# Patient Record
Sex: MALE | Race: BLACK OR AFRICAN AMERICAN | Employment: STUDENT | URBAN - METROPOLITAN AREA
[De-identification: names, ages, dates, MRNs, and addresses within clinical notes are randomized per-mention and may not be internally consistent; named-entity substitution may affect disease eponyms.]

---
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
## 2023-10-30 ENCOUNTER — TELEPHONE (OUTPATIENT)
Dept: FAMILY MEDICINE CLINIC | Age: 23
End: 2023-10-30

## 2023-12-06 PROBLEM — F22 DELUSIONAL DISORDERS (HCC): Status: ACTIVE | Noted: 2018-07-17

## 2023-12-07 ENCOUNTER — TELEPHONE (OUTPATIENT)
Dept: FAMILY MEDICINE CLINIC | Age: 23
End: 2023-12-07

## 2024-02-16 ENCOUNTER — TELEPHONE (OUTPATIENT)
Dept: FAMILY MEDICINE CLINIC | Age: 24
End: 2024-02-16

## 2024-02-19 NOTE — TELEPHONE ENCOUNTER
Has not establish with [facility]. Will put that he can't make an appt with [facility].

## 2024-05-25 ENCOUNTER — HOSPITAL ENCOUNTER (INPATIENT)
Age: 24
LOS: 9 days | Discharge: HOME | DRG: 750 | End: 2024-06-03
Attending: EMERGENCY MEDICINE | Admitting: PSYCHIATRY & NEUROLOGY
Payer: COMMERCIAL

## 2024-05-25 DIAGNOSIS — F20.9 SCHIZOPHRENIA, UNSPECIFIED TYPE (HCC): Primary | ICD-10-CM

## 2024-05-25 LAB
ALBUMIN SERPL-MCNC: 4.9 G/DL (ref 3.5–4.6)
ALP SERPL-CCNC: 57 U/L (ref 35–104)
ALT SERPL-CCNC: 21 U/L (ref 0–41)
AMPHET UR QL SCN: NORMAL
ANION GAP SERPL CALCULATED.3IONS-SCNC: 14 MEQ/L (ref 9–15)
APAP SERPL-MCNC: <5 UG/ML (ref 10–30)
AST SERPL-CCNC: 26 U/L (ref 0–40)
BARBITURATES UR QL SCN: NORMAL
BASOPHILS # BLD: 0 K/UL (ref 0–0.2)
BASOPHILS NFR BLD: 0.2 %
BENZODIAZ UR QL SCN: NORMAL
BILIRUB SERPL-MCNC: 0.7 MG/DL (ref 0.2–0.7)
BUN SERPL-MCNC: 5 MG/DL (ref 6–20)
CALCIUM SERPL-MCNC: 9.1 MG/DL (ref 8.5–9.9)
CANNABINOIDS UR QL SCN: NORMAL
CHLORIDE SERPL-SCNC: 96 MEQ/L (ref 95–107)
CHOLEST SERPL-MCNC: 157 MG/DL (ref 0–199)
CK SERPL-CCNC: 282 U/L (ref 0–190)
CO2 SERPL-SCNC: 24 MEQ/L (ref 20–31)
COCAINE UR QL SCN: NORMAL
CREAT SERPL-MCNC: 0.88 MG/DL (ref 0.7–1.2)
DRUG SCREEN COMMENT UR-IMP: NORMAL
EOSINOPHIL # BLD: 0 K/UL (ref 0–0.7)
EOSINOPHIL NFR BLD: 0.1 %
ERYTHROCYTE [DISTWIDTH] IN BLOOD BY AUTOMATED COUNT: 12.6 % (ref 11.5–14.5)
ETHANOL PERCENT: NORMAL G/DL
ETHANOLAMINE SERPL-MCNC: <10 MG/DL (ref 0–0.08)
FENTANYL SCREEN, URINE: NORMAL
GLOBULIN SER CALC-MCNC: 3.1 G/DL (ref 2.3–3.5)
GLUCOSE SERPL-MCNC: 157 MG/DL (ref 70–99)
HCT VFR BLD AUTO: 41.1 % (ref 42–52)
HDLC SERPL-MCNC: 87 MG/DL (ref 40–59)
HGB BLD-MCNC: 14.4 G/DL (ref 14–18)
LDLC SERPL CALC-MCNC: 54 MG/DL (ref 0–129)
LYMPHOCYTES # BLD: 0.8 K/UL (ref 1–4.8)
LYMPHOCYTES NFR BLD: 9.2 %
MCH RBC QN AUTO: 31.9 PG (ref 27–31.3)
MCHC RBC AUTO-ENTMCNC: 35 % (ref 33–37)
MCV RBC AUTO: 91.1 FL (ref 79–92.2)
METHADONE UR QL SCN: NORMAL
MONOCYTES # BLD: 1 K/UL (ref 0.2–0.8)
MONOCYTES NFR BLD: 10.5 %
NEUTROPHILS # BLD: 7.3 K/UL (ref 1.4–6.5)
NEUTS SEG NFR BLD: 79.7 %
OPIATES UR QL SCN: NORMAL
OXYCODONE UR QL SCN: NORMAL
PCP UR QL SCN: NORMAL
PLATELET # BLD AUTO: 276 K/UL (ref 130–400)
POTASSIUM SERPL-SCNC: 3.6 MEQ/L (ref 3.4–4.9)
PROPOXYPH UR QL SCN: NORMAL
PROT SERPL-MCNC: 8 G/DL (ref 6.3–8)
RBC # BLD AUTO: 4.51 M/UL (ref 4.7–6.1)
SALICYLATES SERPL-MCNC: <0.3 MG/DL (ref 15–30)
SODIUM SERPL-SCNC: 134 MEQ/L (ref 135–144)
TRIGL SERPL-MCNC: 79 MG/DL (ref 0–150)
TSH SERPL-MCNC: 1.15 UIU/ML (ref 0.44–3.86)
WBC # BLD AUTO: 9.2 K/UL (ref 4.8–10.8)

## 2024-05-25 PROCEDURE — 80061 LIPID PANEL: CPT

## 2024-05-25 PROCEDURE — 85025 COMPLETE CBC W/AUTO DIFF WBC: CPT

## 2024-05-25 PROCEDURE — 99285 EMERGENCY DEPT VISIT HI MDM: CPT

## 2024-05-25 PROCEDURE — 93005 ELECTROCARDIOGRAM TRACING: CPT | Performed by: EMERGENCY MEDICINE

## 2024-05-25 PROCEDURE — 82550 ASSAY OF CK (CPK): CPT

## 2024-05-25 PROCEDURE — 80143 DRUG ASSAY ACETAMINOPHEN: CPT

## 2024-05-25 PROCEDURE — 82077 ASSAY SPEC XCP UR&BREATH IA: CPT

## 2024-05-25 PROCEDURE — 1240000000 HC EMOTIONAL WELLNESS R&B

## 2024-05-25 PROCEDURE — 84443 ASSAY THYROID STIM HORMONE: CPT

## 2024-05-25 PROCEDURE — 80179 DRUG ASSAY SALICYLATE: CPT

## 2024-05-25 PROCEDURE — 36415 COLL VENOUS BLD VENIPUNCTURE: CPT

## 2024-05-25 PROCEDURE — 6360000002 HC RX W HCPCS: Performed by: PSYCHIATRY & NEUROLOGY

## 2024-05-25 PROCEDURE — 80307 DRUG TEST PRSMV CHEM ANLYZR: CPT

## 2024-05-25 PROCEDURE — 6370000000 HC RX 637 (ALT 250 FOR IP): Performed by: PSYCHIATRY & NEUROLOGY

## 2024-05-25 PROCEDURE — 83036 HEMOGLOBIN GLYCOSYLATED A1C: CPT

## 2024-05-25 PROCEDURE — 80053 COMPREHEN METABOLIC PANEL: CPT

## 2024-05-25 RX ORDER — LORAZEPAM 2 MG/ML
3 INJECTION INTRAMUSCULAR
Status: DISCONTINUED | OUTPATIENT
Start: 2024-05-25 | End: 2024-05-29

## 2024-05-25 RX ORDER — LORAZEPAM 1 MG/1
1 TABLET ORAL EVERY 4 HOURS PRN
Status: DISCONTINUED | OUTPATIENT
Start: 2024-05-25 | End: 2024-05-25

## 2024-05-25 RX ORDER — BENZTROPINE MESYLATE 1 MG/ML
2 INJECTION INTRAMUSCULAR; INTRAVENOUS 2 TIMES DAILY PRN
Status: DISCONTINUED | OUTPATIENT
Start: 2024-05-25 | End: 2024-05-27

## 2024-05-25 RX ORDER — FOLIC ACID 1 MG/1
1 TABLET ORAL DAILY
Status: DISCONTINUED | OUTPATIENT
Start: 2024-05-26 | End: 2024-06-03 | Stop reason: HOSPADM

## 2024-05-25 RX ORDER — LANOLIN ALCOHOL/MO/W.PET/CERES
100 CREAM (GRAM) TOPICAL DAILY
Status: DISCONTINUED | OUTPATIENT
Start: 2024-05-26 | End: 2024-06-03 | Stop reason: HOSPADM

## 2024-05-25 RX ORDER — PALIPERIDONE 6 MG/1
6 TABLET, EXTENDED RELEASE ORAL DAILY
Status: DISCONTINUED | OUTPATIENT
Start: 2024-05-25 | End: 2024-05-26

## 2024-05-25 RX ORDER — HALOPERIDOL 5 MG/1
5 TABLET ORAL EVERY 6 HOURS PRN
Status: DISCONTINUED | OUTPATIENT
Start: 2024-05-25 | End: 2024-06-03 | Stop reason: HOSPADM

## 2024-05-25 RX ORDER — HYDROXYZINE PAMOATE 50 MG/1
50 CAPSULE ORAL EVERY 6 HOURS PRN
Status: DISCONTINUED | OUTPATIENT
Start: 2024-05-25 | End: 2024-06-03 | Stop reason: HOSPADM

## 2024-05-25 RX ORDER — LORAZEPAM 1 MG/1
1 TABLET ORAL
Status: DISCONTINUED | OUTPATIENT
Start: 2024-05-25 | End: 2024-05-29

## 2024-05-25 RX ORDER — MAGNESIUM HYDROXIDE/ALUMINUM HYDROXICE/SIMETHICONE 120; 1200; 1200 MG/30ML; MG/30ML; MG/30ML
30 SUSPENSION ORAL PRN
Status: DISCONTINUED | OUTPATIENT
Start: 2024-05-25 | End: 2024-06-03 | Stop reason: HOSPADM

## 2024-05-25 RX ORDER — LORAZEPAM 0.5 MG/1
0.5 TABLET ORAL
Status: DISCONTINUED | OUTPATIENT
Start: 2024-05-25 | End: 2024-05-29

## 2024-05-25 RX ORDER — LORAZEPAM 0.5 MG/1
0.5 TABLET ORAL EVERY 4 HOURS PRN
Status: DISCONTINUED | OUTPATIENT
Start: 2024-05-25 | End: 2024-05-25

## 2024-05-25 RX ORDER — TRAZODONE HYDROCHLORIDE 50 MG/1
50 TABLET ORAL NIGHTLY PRN
Status: DISCONTINUED | OUTPATIENT
Start: 2024-05-25 | End: 2024-06-03 | Stop reason: HOSPADM

## 2024-05-25 RX ORDER — LORAZEPAM 2 MG/ML
4 INJECTION INTRAMUSCULAR
Status: DISCONTINUED | OUTPATIENT
Start: 2024-05-25 | End: 2024-05-25

## 2024-05-25 RX ORDER — MIRTAZAPINE 15 MG/1
15 TABLET, FILM COATED ORAL NIGHTLY
Status: DISCONTINUED | OUTPATIENT
Start: 2024-05-25 | End: 2024-06-03 | Stop reason: HOSPADM

## 2024-05-25 RX ORDER — ACETAMINOPHEN 325 MG/1
650 TABLET ORAL EVERY 4 HOURS PRN
Status: DISCONTINUED | OUTPATIENT
Start: 2024-05-25 | End: 2024-06-03 | Stop reason: HOSPADM

## 2024-05-25 RX ORDER — LORAZEPAM 2 MG/ML
0.5 INJECTION INTRAMUSCULAR
Status: DISCONTINUED | OUTPATIENT
Start: 2024-05-25 | End: 2024-05-29

## 2024-05-25 RX ORDER — HALOPERIDOL 5 MG/ML
5 INJECTION INTRAMUSCULAR EVERY 6 HOURS PRN
Status: DISCONTINUED | OUTPATIENT
Start: 2024-05-25 | End: 2024-06-03 | Stop reason: HOSPADM

## 2024-05-25 RX ORDER — LORAZEPAM 2 MG/1
2 TABLET ORAL
Status: DISCONTINUED | OUTPATIENT
Start: 2024-05-25 | End: 2024-05-25

## 2024-05-25 RX ORDER — LORAZEPAM 2 MG/1
4 TABLET ORAL
Status: DISCONTINUED | OUTPATIENT
Start: 2024-05-25 | End: 2024-05-29

## 2024-05-25 RX ORDER — LORAZEPAM 2 MG/ML
1 INJECTION INTRAMUSCULAR
Status: DISCONTINUED | OUTPATIENT
Start: 2024-05-25 | End: 2024-05-29

## 2024-05-25 RX ORDER — LORAZEPAM 2 MG/ML
2 INJECTION INTRAMUSCULAR
Status: DISCONTINUED | OUTPATIENT
Start: 2024-05-25 | End: 2024-05-29

## 2024-05-25 RX ORDER — HYDROXYZINE HYDROCHLORIDE 50 MG/ML
50 INJECTION, SOLUTION INTRAMUSCULAR EVERY 6 HOURS PRN
Status: DISCONTINUED | OUTPATIENT
Start: 2024-05-25 | End: 2024-06-03 | Stop reason: HOSPADM

## 2024-05-25 RX ORDER — LORAZEPAM 2 MG/ML
4 INJECTION INTRAMUSCULAR
Status: DISCONTINUED | OUTPATIENT
Start: 2024-05-25 | End: 2024-05-29

## 2024-05-25 RX ORDER — MIRTAZAPINE 15 MG/1
15 TABLET, FILM COATED ORAL NIGHTLY
COMMUNITY

## 2024-05-25 RX ORDER — ONDANSETRON 4 MG/1
4 TABLET, ORALLY DISINTEGRATING ORAL EVERY 6 HOURS PRN
Status: DISCONTINUED | OUTPATIENT
Start: 2024-05-25 | End: 2024-06-03 | Stop reason: HOSPADM

## 2024-05-25 RX ORDER — LORAZEPAM 2 MG/1
2 TABLET ORAL
Status: DISCONTINUED | OUTPATIENT
Start: 2024-05-25 | End: 2024-05-29

## 2024-05-25 RX ADMIN — MIRTAZAPINE 15 MG: 15 TABLET, FILM COATED ORAL at 20:38

## 2024-05-25 RX ADMIN — PALIPERIDONE 6 MG: 6 TABLET, EXTENDED RELEASE ORAL at 16:20

## 2024-05-25 RX ADMIN — HYDROXYZINE PAMOATE 50 MG: 50 CAPSULE ORAL at 15:23

## 2024-05-25 RX ADMIN — HYDROXYZINE HYDROCHLORIDE 50 MG: 50 INJECTION, SOLUTION INTRAMUSCULAR at 21:44

## 2024-05-25 RX ADMIN — HALOPERIDOL 5 MG: 5 TABLET ORAL at 21:24

## 2024-05-25 RX ADMIN — HALOPERIDOL 5 MG: 5 TABLET ORAL at 15:23

## 2024-05-25 ASSESSMENT — LIFESTYLE VARIABLES
HOW OFTEN DO YOU HAVE A DRINK CONTAINING ALCOHOL: 4 OR MORE TIMES A WEEK
HOW MANY STANDARD DRINKS CONTAINING ALCOHOL DO YOU HAVE ON A TYPICAL DAY: 3 OR 4

## 2024-05-25 ASSESSMENT — PAIN - FUNCTIONAL ASSESSMENT: PAIN_FUNCTIONAL_ASSESSMENT: NONE - DENIES PAIN

## 2024-05-25 NOTE — GROUP NOTE
Group Therapy Note    Date: 5/25/2024    Group Start Time: 1630  Group End Time: 1730  Group Topic: Activity    [facility] 3W Natasha Denney    Patients started off group by sharing negative phrases that people have said to them, or that they have thought about themselves.These phrases were written on the board, we came back to them later. Patients were encouraged to \"take as much as you need\" from a roll of toilet paper, with no other explanation. Patients discovered that for each square of toilet paper, was one fact that they had to share about themselves. Once patients finished sharing their facts, they were invited to throw the crumpled up pieces of toilet paper at the white board \"knocking out\" each negative phrase one by one. Each phrase that was hit was reworded by the group into a more positive manner.     Group Therapy Note    Attendees: 9/19     Notes:  Pt did not attend group.    Modes of Intervention: Education and Activity      Discipline Responsible: [employer]      Signature:  Natasha Vanegas

## 2024-05-25 NOTE — ED NOTES
Reviewed patient with Dr. Rasmussen. Reviewed current and past history and unable to obtain much information secondary to thought blocking, unknown amount of alcohol that he is drinking. Orders received to admit to 3W with standard prns, resume home medications (invega and remeron) and place on ciwa protocol with ativan secondary to unknown amount of alcohol usage.

## 2024-05-25 NOTE — ED NOTES
Provisional Diagnosis:   Schizophrenia     Psychosocial and Contextual Factors:    Born and raised in [place]. Graduated from [employer]. Does not work. First Schizophrenic break in 2019. Lives at home with Mom but she reports he was staying with a girlfriend and just showed back up last night.     C-SSRS Summary:    C-SSRS Suicide Screening1) Within the past month, have you wished you were dead or wished you could go to sleep and not wake up? : No2) Have you actually had any thoughts of killing yourself? : No6) Have you ever done anything, started to do anything, or prepared to do anything to end your life?: NoRisk of Suicide: No Risk  C-SSRS Risk AssessmentSuicidal and Self-Injurious Behavior : Denies suicidal and self-injurious behaviorSuicidal Ideation (Most Severe in Past Month): Denies suicidal ideationTreatment History: Noncompliant with treatment    Patient: cooperative, disorganized, slow to respond   Family: Mom brought patient in: reports he has been staying with a girlfriend for the past several weeks and he just came to the house last night and was acting this way and brought him in for evaluation. Reports he has been off his medications probably for the past 2 to 3 weeks.   Agency: [facility], active with psychiatry, Had telehealth appt. On 5/9/24 with Jonathan Plaza     Substance Abuse: Reports drinking alcohol but states \"not that much\" His mom reports he has been smoking marijuana however [employer]-Tox is negative upon arrival.     Present Suicidal Behavior:      Verbal: Denies    Attempt:Denies    Past Suicidal Behavior:     Verbal:Denies    Attempt:Denies      Self-Injurious/Self-Mutilation:Denies      Violence Current or Past; Denies. Mom also reports no history of violence that she is aware of       Trauma Identified:  Denies     Protective Factors:    Active with [facility]   Supportive Family       Risk Factors:    Poor Insight         Clinical Summary:    Patient presented to the ED accompanied by his Mom

## 2024-05-25 NOTE — ED NOTES
Pt was changed into BH clothes, belongings placed in locker 4, urine sample obtained and sent to lab.

## 2024-05-25 NOTE — ED PROVIDER NOTES
11:21 AM EDT  [facility] ED  EMERGENCY DEPARTMENT ENCOUNTER      Pt Name: Hugo Nassar  MRN: [id number]  Birthdate 2000  Date of evaluation: 5/25/2024  Provider: Guy Bird MD    CHIEF COMPLAINT       Chief Complaint   Patient presents with   • Mental Health Problem     Having a mental break. Has been off his meds for 2 weeks and drinking and smoking weed. History of schizophrenia. Hallucinating, thinking people are chasing him.          HISTORY OF PRESENT ILLNESS   (Location/Symptom, Timing/Onset, Context/Setting, Quality, Duration, Modifying Factors, Severity)  Note limiting factors.   23-year-old male presenting for mental health evaluation.  History of schizophrenia and has been reportedly off his medications.  Mom reports that he is hallucinating.  Denies any SI or HI to me.  History is difficult to obtain from him.      Nursing Notes were reviewed.    REVIEW OF SYSTEMS    (2-9 systems for level 4, 10 or more for level 5)     Review of Systems   Unable to perform ROS: Psychiatric disorder   All other systems reviewed and are negative.      Except as noted above the remainder of the review of systems was reviewed and negative.       PAST MEDICAL HISTORY     Past Medical History:   Diagnosis Date   • Anxiety    • Attention deficit disorder    • Depression          SURGICAL HISTORY       Past Surgical History:   Procedure Laterality Date   • DENTAL SURGERY  2006   • FACIAL COSMETIC SURGERY Bilateral 11/1/2019    REVISION EAR LOBE BILATERAL performed by Gabi Wick MD at [facility] [place] [place]         CURRENT MEDICATIONS       Previous Medications    MIRTAZAPINE (REMERON) 15 MG TABLET    Take 1 tablet by mouth nightly    PALIPERIDONE (INVEGA) 6 MG EXTENDED RELEASE TABLET    Take 1 tablet by mouth daily       ALLERGIES     Patient has no known allergies.    FAMILY HISTORY     No family history on file.       SOCIAL HISTORY       Social History     Socioeconomic History   • Marital status: Single   Tobacco

## 2024-05-26 LAB
ESTIMATED AVERAGE GLUCOSE: 100 MG/DL
HBA1C MFR BLD: 5.1 % (ref 4–6)

## 2024-05-26 PROCEDURE — 6370000000 HC RX 637 (ALT 250 FOR IP): Performed by: PSYCHIATRY & NEUROLOGY

## 2024-05-26 PROCEDURE — 1240000000 HC EMOTIONAL WELLNESS R&B

## 2024-05-26 RX ORDER — PALIPERIDONE 9 MG/1
9 TABLET, EXTENDED RELEASE ORAL DAILY
Status: DISCONTINUED | OUTPATIENT
Start: 2024-05-27 | End: 2024-06-03 | Stop reason: HOSPADM

## 2024-05-26 RX ADMIN — MIRTAZAPINE 15 MG: 15 TABLET, FILM COATED ORAL at 21:22

## 2024-05-26 RX ADMIN — FOLIC ACID 1 MG: 1 TABLET ORAL at 08:25

## 2024-05-26 RX ADMIN — PALIPERIDONE 6 MG: 6 TABLET, EXTENDED RELEASE ORAL at 08:25

## 2024-05-26 RX ADMIN — Medication 100 MG: at 08:25

## 2024-05-26 RX ADMIN — HYDROXYZINE PAMOATE 50 MG: 50 CAPSULE ORAL at 10:50

## 2024-05-26 RX ADMIN — HALOPERIDOL 5 MG: 5 TABLET ORAL at 21:22

## 2024-05-26 NOTE — CONSULTS
HISTORY AND PHYSICAL             Date: 5/26/2024        Patient Name: Hugo Nassar     YOB: 2000      Age:  23 y.o.    Chief Complaint     Chief Complaint   Patient presents with    Mental Health Problem     Having a mental break. Has been off his meds for 2 weeks and drinking and smoking weed. History of schizophrenia. Hallucinating, thinking people are chasing him.           History Obtained From   patient, electronic medical record    History of Present Illness   Patient is a 23-year-old male with a past medical history of schizophrenia, anxiety, attention deficit disorder and depression who admitted for psychiatric evaluation for mental breakdown and hallucinating.  Per EMR mother reported patient has been off his medication for 2 weeks and started drinking alcohol and smoking marijuana.  Patient denies suicidal ideation.  He is calm and cooperative during assessment denies suicidal or homicidal ideations.  No concerns voiced.  Patient denies chest pain, palpitations, lightheadedness, headache, dizziness, shortness of breath, cough, N/V/D, and changes in appetite.  Patient also denies smoking, illicit drug, and alcohol use.  Denies self-inflicted injuries or wounds.  Hospitalist consulted to evaluate and make recommendations for acute and chronic disease are receiving inpatient psych services.    Past Medical History     Past Medical History:   Diagnosis Date    Anxiety     Attention deficit disorder     Depression         Past Surgical History     Past Surgical History:   Procedure Laterality Date    DENTAL SURGERY  2006    FACIAL COSMETIC SURGERY Bilateral 11/1/2019    REVISION EAR LOBE BILATERAL [place] [facility] [place]        Medications Prior to Admission     Prior to Admission medications    Medication Sig Start Date End Date Taking? Authorizing Provider   mirtazapine (REMERON) 15 MG tablet Take 1 tablet by mouth nightly   Yes Provider, MD Joey   paliperidone

## 2024-05-26 NOTE — CARE COORDINATION
Morning Community Meeting Topics    Hugo Nassar attended the morning community meeting on 5/26/24.    Topics discussed today     [x] Introduction  Day of the week and date  Mask distribution  Current mask requirements  [x]Teams  Explanation of  Green and Blue team criteria  Nurses assigned to each team for today  Explanation about green and blue paper  Date  Patient's Name  Patient's Nurse  Goals  [x] Visitation  Announce the visiting hours for the day  Announce which team is allowed to have visitors for the day  Review any updated Covid 19 requirements for visitors during visitation  Vaccine Card or negative Covid test within 48 hours of visit  State Identification  Patients are reminded to alert the  at least 1 hour before visitation   [x] Unit Orientation  Coffee use  Phone location and etiquette  Shower locations  Washer and dryer location and process  Common area expectations  Staff rounds expectation  [x] Meals   Educate patient to the menu  The patient is encouraged to fill out the menu to get preferences at mealtime  The patient is educated that if they do not fill out the menu, they will get the standard tray  The coffee pot is decaf, patient encouraged to order regular coffee from menu.  Educate patient to the meal process  Patient encouraged to eat snacks provided twice daily  Snacks may stay in patient room     [x] Discharge Process  Discharge expectations  Fill out the survey after discharge   [x] Hygiene  Daily showers encouraged  Showers availability discussed   Daily dressing encouraged  Discussed wearing street clothing  Education provided on where to place linens and clothing  Linens in the hamper  personal clothing does not go into the linen hamper  [x] Group   Patient encouraged to attend group provided  Time of Group Meetings discussed  Gentle reminder that attendance is a Physician order  [x] Movement  Chair exercises completed  Stretching completed  Notes:   Patient actively

## 2024-05-26 NOTE — GROUP NOTE
Group Therapy Note    Date: 5/26/2024    Group Start Time: 1000  Group End Time: 1050  Group Topic: Art Therapy     [facility] 3W Elza Garcias, JULIO        Group Therapy Note    Attendees: 7       Patient's Goal:  To participate in morning group art therapy.     Notes:  Patient attended briefly and left prematurely.     Modes of Intervention: Activity      Discipline Responsible: Psychoeducational Specialist      Signature:  Elza Blanca MA ATR

## 2024-05-26 NOTE — CARE COORDINATION
Psychosocial Assessment    Current Level of Psychosocial Functioning     Independent  X  Dependent    Minimal Assist     Comments:  Pt lives between moms [place] and girlfriends [place]. Pt is open with [facility] and reports non compliance over the last 2-3 weeks time. Pt exhibits lethargy and some poverty of thought. Denies all current A/VH.     Psychosocial High Risk Factors (check all that apply)    Unable to obtain meds   Chronic illness/pain    Substance abuse X  Lack of Family Support   Financial stress X  Isolation X  Inadequate Community Resources  Suicide attempt(s)  Not taking medications X  Victim of crime   Developmental Delay  Unable to manage personal needs    Age 65 or older   Homeless  No transportation X  Readmission within 30 days  Unemployment X  Traumatic Event     Family/Supports identified: mom/ girlfriend    Sexual Orientation:  heterosexual    Patient Strengths: stable housing, open services, motivated for treatment, mildly insightful.    Patient Barriers: substance use, non compliance, impulsivity, no transportation no employment    Safety plan: pt able and willing to safety plan. Mildly insightful about mental health issues and reports he's motivated to be compliant with medication.     [facility]/MH history:1st break in 2019- prior hospitalizations, some non compliance in history. Moderate substance use current    Plan of Care:  medication management, group/individual therapies, family meetings, psycho -education, treatment team meetings to assist with stabilization    Initial Discharge Plan:  return home to moms/ f/u with [facility] (open)    Clinical Summary:      Pt is a 23 year old,  male who presents as lethargic with a poverty of thought and a flat affect. Pt denies any current A/VH and denies any current SI/HI.   Pt denies all legal issues as an adult but some issues as a child with school expulsion. Pt reports

## 2024-05-26 NOTE — H&P
Department of Psychiatry  History and Physical - Adult         Behavioral Services  Medicare Certification Upon Admission    I certify that this patient's inpatient psychiatric hospital admission is medically necessary for:    [x] (1) Treatment which could reasonably be expected to improve this patient's condition,       [x] (2) Or for diagnostic study;     AND     [x](2) The inpatient psychiatric services are provided while the individual is under the care of a physician and are included in the individualized plan of care.    Estimated length of stay/service 5-7 days, depending on stability    Plan for post-hospital care follow up with outpatient provider    Electronically signed by Sriram Rasmussen MD on 5/26/2024 at 6:57 PM        CHIEF COMPLAINT:  \"I've been doing better- I haven't been talking right a little bit\" \"I've got Schizophrenia\"    History obtained from:  patient, electronic medical record    Patient was seen after discussing with the treatment team and reviewing the chart    CIRCUMSTANCES OF ADMISSION:   Mr. Hugo Nassar is a 23 y.o. male with a history of Schizophrenia, who was brought to the ER by his mother for a \"mental breakdown\". Per ER note, \"Patient presented to the ED accompanied by his Mom for having a \"mental break.\" Reported from his Mom that he has been off of his medications for the past 2 weeks. She states that he has been staying with his girlfriend for about 3 weeks and believes he stopped taking medications but is unsure. Patient just showed up to his Moms house last night after he was staying with the girlfriend. Mom then brought him to the ED as he continued to display bizarre behaviors throughout the night.  Patient reports he \"forgets them sometimes.\" Patient presents as disorganized, slow to respond and having a difficult time with his word finding. He is able to answer some questions, others he will extensively think about while rubbing his head and then states \"I don't

## 2024-05-26 NOTE — GROUP NOTE
Group Therapy Note    Date: 5/25/2024    Group Start Time: 2035  Group End Time: 2115  Group Topic: Wrap-Up    [facility] 3W Natasha Denney        Group Therapy Note    Attendees: 6/19       Notes:  Pt did not attend.     Modes of Intervention: Activity      Discipline Responsible: Behavorial Health Tech      Signature:  Natasha Vanegas

## 2024-05-26 NOTE — CARE COORDINATION
5/26/2024 @ 1305 - Patient was approached regarding the completion of the Leisure Assessment. He presented as fatigued and lethargic. Patient stated he was having difficulties with focus and concentration. When asked about his coping strategies, patient stated he likes to listen to music and play basketball. He prefers to be socially engaged with one other person as opposed to a group of friends. Patient stated his family is supportive and hopes to get better while admitted to [facility]. He stated his challenge is managing intense feelings. Patient believes he is \"emotional\" too frequently and would like to become more stable. He presented with fatigue and poor concentration the entire session.    Documentation completed by: Elza Blanca MA ATR

## 2024-05-26 NOTE — CARE COORDINATION
Brief Intervention and Referral to Treatment Summary    Patient was provided PHQ-9, AUDIT-C and DAST Screening:      PHQ-9 Score: not assessed  AUDIT-C Score:  5  DAST Score:  0    Patient’s substance use is considered     Moderate Risk      Patient’s depression is considered:     Unknown- not assessed    Brief Education Was Provided    Patient was receptive      Brief Intervention Is Provided (Only for AUDIT-C or DAST)     Patient reports readiness to decrease and/or stop use and a plan was discussed   Patient denies readiness to decrease and/or stop use and a plan was not discussed X    Recommendations/Referrals for Brief and/or Specialized Treatment Provided to Patient:      Pt denies substance use is problematic. Refusal of all sobriety services offered.

## 2024-05-27 PROCEDURE — 1240000000 HC EMOTIONAL WELLNESS R&B

## 2024-05-27 PROCEDURE — 6370000000 HC RX 637 (ALT 250 FOR IP): Performed by: PSYCHIATRY & NEUROLOGY

## 2024-05-27 RX ORDER — BENZTROPINE MESYLATE 1 MG/1
1 TABLET ORAL 2 TIMES DAILY PRN
Status: DISCONTINUED | OUTPATIENT
Start: 2024-05-27 | End: 2024-06-03 | Stop reason: HOSPADM

## 2024-05-27 RX ORDER — BENZTROPINE MESYLATE 1 MG/ML
1 INJECTION INTRAMUSCULAR; INTRAVENOUS 2 TIMES DAILY PRN
Status: DISCONTINUED | OUTPATIENT
Start: 2024-05-27 | End: 2024-06-03 | Stop reason: HOSPADM

## 2024-05-27 RX ADMIN — HYDROXYZINE PAMOATE 50 MG: 50 CAPSULE ORAL at 16:37

## 2024-05-27 RX ADMIN — TRAZODONE HYDROCHLORIDE 50 MG: 50 TABLET ORAL at 22:35

## 2024-05-27 RX ADMIN — FOLIC ACID 1 MG: 1 TABLET ORAL at 08:44

## 2024-05-27 RX ADMIN — MIRTAZAPINE 15 MG: 15 TABLET, FILM COATED ORAL at 20:22

## 2024-05-27 RX ADMIN — Medication 100 MG: at 08:44

## 2024-05-27 RX ADMIN — PALIPERIDONE 9 MG: 9 TABLET, EXTENDED RELEASE ORAL at 08:44

## 2024-05-27 RX ADMIN — HALOPERIDOL 5 MG: 5 TABLET ORAL at 20:22

## 2024-05-27 RX ADMIN — BENZTROPINE MESYLATE 1 MG: 1 TABLET ORAL at 13:04

## 2024-05-27 ASSESSMENT — PATIENT HEALTH QUESTIONNAIRE - PHQ9
SUM OF ALL RESPONSES TO PHQ QUESTIONS 1-9: 0
1. LITTLE INTEREST OR PLEASURE IN DOING THINGS: NOT AT ALL
SUM OF ALL RESPONSES TO PHQ9 QUESTIONS 1 & 2: 0
2. FEELING DOWN, DEPRESSED OR HOPELESS: NOT AT ALL
SUM OF ALL RESPONSES TO PHQ QUESTIONS 1-9: 0

## 2024-05-27 ASSESSMENT — SLEEP AND FATIGUE QUESTIONNAIRES
DO YOU USE A SLEEP AID: NO
SLEEP PATTERN: DIFFICULTY FALLING ASLEEP;INSOMNIA
AVERAGE NUMBER OF SLEEP HOURS: 3
DO YOU HAVE DIFFICULTY SLEEPING: YES

## 2024-05-27 NOTE — GROUP NOTE
Patient did not attend group.    Date: 5/27/2024    Group Start Time: 0935  Group End Time: 1045  Group Topic: Music Therapy    [facility] 3W Karine Espinoza    Song Share  Patients will identify different songs that fall under various prompts (represents how you feel, represents how you want to feel, energy, relaxation, inspiration, and a song you never get sick of hearing). Patients will be invited to select a song that aligns with something they need today. Patients will be encouraged to explain their connection to the song and the experience of listening to it in this setting. Patients will discuss the benefits of intentional music listening as a coping skill to improve mood and regulate emotions.     Focus: Coping Skills (Emotional Regulation), Validation/Support, Self-Esteem, & Self-Expression    Goals: Improve Mood, Improve Insight/Self-Awareness, Increase Socialization/Community Building, Increase Self-Expression, Improve Attention to Task, Improve Coping Skills/Develop Coping Skills, Improve Emotional Regulation Skills     Documentation completed by ENRIQUE Chavarria, PsychoEd Spec

## 2024-05-27 NOTE — GROUP NOTE
Patient did not attend group.    Date: 5/27/2024    Group Start Time: 1235  Group End Time: 1330  Group Topic: Music Therapy    [facility] [place] 3W Karine Espinoza    Active Music Making, Live Music Listening, and Music Reminiscence  Patients will be given a songbook and offered the opportunity to select (a) song(s) of their choice for live music listening on [employer]. Patients will be encouraged to sing along, move along, and listen to the music.     Focus: Self-Expression, Processing, Self-Esteem, Coping Skills, and Building Positive Experiences    Goals: Improve Mood, Decrease Isolation, Increase Sense of Community/Socialization, Improve Self-Esteem, Increase Self-Expression, Provide Sense of Autonomy, Develop Coping Skills     Documentation completed by ENRIQUE Chavarria, PsychoEd Spec

## 2024-05-27 NOTE — GROUP NOTE
Group Therapy Note    Date: 5/26/2024    Group Start Time: 2000  Group End Time: 2030  Group Topic: Wrap-Up    [facility] 3W Natasha Denney        Group Therapy Note    Attendees: 10/21     Notes:  Pt did not attend.    Modes of Intervention: Socialization      Discipline Responsible: Behavorial Health Tech      Signature:  Natasha Vanegas

## 2024-05-28 LAB
EKG ATRIAL RATE: 95 BPM
EKG P AXIS: 82 DEGREES
EKG P-R INTERVAL: 144 MS
EKG Q-T INTERVAL: 332 MS
EKG QRS DURATION: 82 MS
EKG QTC CALCULATION (BAZETT): 417 MS
EKG R AXIS: 86 DEGREES
EKG T AXIS: 58 DEGREES
EKG VENTRICULAR RATE: 95 BPM

## 2024-05-28 PROCEDURE — 6370000000 HC RX 637 (ALT 250 FOR IP): Performed by: PSYCHIATRY & NEUROLOGY

## 2024-05-28 PROCEDURE — 93010 ELECTROCARDIOGRAM REPORT: CPT | Performed by: INTERNAL MEDICINE

## 2024-05-28 PROCEDURE — 99233 SBSQ HOSP IP/OBS HIGH 50: CPT | Performed by: PSYCHIATRY & NEUROLOGY

## 2024-05-28 PROCEDURE — 1240000000 HC EMOTIONAL WELLNESS R&B

## 2024-05-28 RX ORDER — BENZTROPINE MESYLATE 1 MG/1
1 TABLET ORAL 2 TIMES DAILY
Status: DISCONTINUED | OUTPATIENT
Start: 2024-05-28 | End: 2024-06-03 | Stop reason: HOSPADM

## 2024-05-28 RX ADMIN — BENZTROPINE MESYLATE 1 MG: 1 TABLET ORAL at 10:53

## 2024-05-28 RX ADMIN — FOLIC ACID 1 MG: 1 TABLET ORAL at 08:52

## 2024-05-28 RX ADMIN — Medication 100 MG: at 08:52

## 2024-05-28 RX ADMIN — PALIPERIDONE 9 MG: 9 TABLET, EXTENDED RELEASE ORAL at 08:52

## 2024-05-28 RX ADMIN — BENZTROPINE MESYLATE 1 MG: 1 TABLET ORAL at 20:43

## 2024-05-28 RX ADMIN — HYDROXYZINE PAMOATE 50 MG: 50 CAPSULE ORAL at 20:44

## 2024-05-28 RX ADMIN — TRAZODONE HYDROCHLORIDE 50 MG: 50 TABLET ORAL at 22:23

## 2024-05-28 RX ADMIN — HYDROXYZINE PAMOATE 50 MG: 50 CAPSULE ORAL at 15:03

## 2024-05-28 RX ADMIN — MIRTAZAPINE 15 MG: 15 TABLET, FILM COATED ORAL at 20:43

## 2024-05-28 NOTE — GROUP NOTE
Patient did not attend group.    Date: 5/28/2024    Group Start Time: 0940  Group End Time: 1040  Group Topic: Psychoeducation    [facility] 3W Karine Espinoza    Rehana Analysis, Stress Management/Coping Skills, & Relaxation Techniques through Music  Patients will listen to \"3 Things\" by Evin Camacho and identify/interpret lyrics and themes that resonated with them. Patients will point out coping skills that are present in the song, and discuss strategies they have used in the past. Patients will learn about stress management, early warning signs of stress, and how to cope with various stressors through brainstorming with peers/staff. Patients will participate in guided relaxation techniques with MT playing the piano and facilitating the experience.     Goals: Improve Mood, Improve Insight/Self-Awareness, Increase Socialization/Community Building, Improve Self-Expression, Improve Attention to Task, Improve Coping Skills/Develop Coping Skills, Improve Interpersonal Socialization Skills, Decrease Negative Self-Talk/Cognitive Distortions    Focus: Coping Skills, Mindfulness Techniques, Building Happiness and Domains of Wellness     Documentation completed by Karine Chiang MT-BC, PsychoEd Spec

## 2024-05-29 PROCEDURE — 1240000000 HC EMOTIONAL WELLNESS R&B

## 2024-05-29 PROCEDURE — 6370000000 HC RX 637 (ALT 250 FOR IP): Performed by: PSYCHIATRY & NEUROLOGY

## 2024-05-29 PROCEDURE — 99232 SBSQ HOSP IP/OBS MODERATE 35: CPT | Performed by: PSYCHIATRY & NEUROLOGY

## 2024-05-29 RX ORDER — NICOTINE 21 MG/24HR
1 PATCH, TRANSDERMAL 24 HOURS TRANSDERMAL DAILY
Status: DISCONTINUED | OUTPATIENT
Start: 2024-05-29 | End: 2024-06-03 | Stop reason: HOSPADM

## 2024-05-29 RX ADMIN — TRAZODONE HYDROCHLORIDE 50 MG: 50 TABLET ORAL at 21:18

## 2024-05-29 RX ADMIN — PALIPERIDONE 9 MG: 9 TABLET, EXTENDED RELEASE ORAL at 08:41

## 2024-05-29 RX ADMIN — HYDROXYZINE PAMOATE 50 MG: 50 CAPSULE ORAL at 15:38

## 2024-05-29 RX ADMIN — MIRTAZAPINE 15 MG: 15 TABLET, FILM COATED ORAL at 21:18

## 2024-05-29 RX ADMIN — HYDROXYZINE PAMOATE 50 MG: 50 CAPSULE ORAL at 09:50

## 2024-05-29 RX ADMIN — BENZTROPINE MESYLATE 1 MG: 1 TABLET ORAL at 08:41

## 2024-05-29 RX ADMIN — Medication 100 MG: at 08:41

## 2024-05-29 RX ADMIN — BENZTROPINE MESYLATE 1 MG: 1 TABLET ORAL at 21:18

## 2024-05-29 RX ADMIN — FOLIC ACID 1 MG: 1 TABLET ORAL at 08:41

## 2024-05-29 RX ADMIN — HALOPERIDOL 5 MG: 5 TABLET ORAL at 19:00

## 2024-05-29 NOTE — GROUP NOTE
Date: 5/29/2024    Group Start Time: 0907  Group End Time: 0945  Group Topic: Community Meeting    [facility] [place] Karine Espinoza Analysis & Song Discussion  Patients will listen to \"I Won't Back Down\" by Roosevelt Wilson and identify/interpret lyrics and themes that resonate with them. Patients will brainstorm ideas of how to keep going in tough times. Patients will identify things that motivate them, a goal they are working on, and a leisure skill they want to get back into.     Focus: Motivation, Personal Values    Goals: Improve Mood, Improve Insight/Self-Awareness, Increase Socialization/Community Building, Increase Self-Expression, Improve Attention to Task, Improve Coping Skills/Develop Coping Skills    Patient listened to peer discussions and contributed when directly prompted. Patient appeared disorganized AEB visible confusion when others were talking and short, delayed responses to prompting. Patient identified that his mom and music motivate him. Patient shared that he wants to obtain his license, a job, and a car in the future. Patient would like to get back into attending [employer].     Attended: 1/2-3/4 attendance  Participation Level: Active Listener and Interactive  Participation Quality: Sharing and Supportive  Affect/Mood: Constricted/Quiet and Reserved  Speech: normal rate and whispered   Thought Content/Processes: Vague and Disorganized  Level of consciousness: Alert  Response: Able to verbalize current knowledge/experience  Outcomes: Actively participated in the group experience and Initial interaction with patient    Discipline Responsible: Music Therapist  Signature: Karine Chiang, MT-BC, PsychEd Spec

## 2024-05-29 NOTE — GROUP NOTE
Group Therapy Note    Date: 5/29/2024    Group Start Time: 1000  Group End Time: 1100  Group Topic: Art Therapy     [facility] 3W Elza Garcias, JULIO        Group Therapy Note    Attendees: 5       Patient's Goal:  To participate in morning group art therapy.    Notes:  Patient did not attend.     Modes of Intervention: Activity      Discipline Responsible: Psychoeducational Specialist      Signature:  Elza BAILEY

## 2024-05-29 NOTE — GROUP NOTE
Patient did not attend group.    Date: 5/29/2024    Group Start Time: 1350  Group End Time: 1430  Group Topic: Music Therapy    [facility] 3W Karine Espinoza Cover Comparisons: Receptive Music Listening  Patients will be introduced to the concept of mindfulness in relation to music listening. Patients will be given a handout to write down thoughts as they listen about song interpretation, vocal quality, instrumentation, and other musical qualities. Patients will be asked to listen to an original version of a song, and then compare it to 1-2 covers of the same song. Patients will discuss the benefits of staying of mindful, challenges of staying mindful, and how mindfulness may apply to their lives.    Focus: Coping Skills and Mindfulness Techniques through music    Goals: Increase Attention to Task, Develop/Maintain Coping Skills, Increase Socialization/Building Community, Improve Mood, Increase/Maintain Expressive Communication, Improve/Maintain Self-Expression, Decrease Impulsive Behaviors, Improve Insight/Self-Awareness    Songs used:   \"Stand by Me\" original by Alok Woodward; covers by Cathy and the Kasey, and Prince Ro      Documentation completed by Karine Chiang, MT-BC, PsychoEd Spec

## 2024-05-29 NOTE — GROUP NOTE
Group Therapy Note    Date: 5/29/2024    Group Start Time: 1200  Group End Time: 1300  Group Topic:  Group    [facility] 3W Nadja Butler LSW        Group Therapy Note    Attendees: 7    Patient's discussed self care assessment addressing 5 areas of care: physical care, spiritual care, emotional care, social care and professional care.       Patient's Goal:  Continue to practice spiritual care.    Notes:  Patient interactive.    Status After Intervention:  Improved    Participation Level: Interactive    Participation Quality: Appropriate      Speech:  normal      Thought Process/Content: Logical      Affective Functioning: Congruent      Mood: depressed      Level of consciousness:  Drowsy      Response to Learning: Able to verbalize current knowledge/experience      Endings: None Reported    Modes of Intervention: Education      Discipline Responsible: /Counselor      Signature:  SANDOR Mccracken

## 2024-05-30 PROCEDURE — 6370000000 HC RX 637 (ALT 250 FOR IP): Performed by: PSYCHIATRY & NEUROLOGY

## 2024-05-30 PROCEDURE — 99232 SBSQ HOSP IP/OBS MODERATE 35: CPT | Performed by: PSYCHIATRY & NEUROLOGY

## 2024-05-30 PROCEDURE — 1240000000 HC EMOTIONAL WELLNESS R&B

## 2024-05-30 RX ADMIN — Medication 100 MG: at 08:32

## 2024-05-30 RX ADMIN — BENZTROPINE MESYLATE 1 MG: 1 TABLET ORAL at 08:31

## 2024-05-30 RX ADMIN — BENZTROPINE MESYLATE 1 MG: 1 TABLET ORAL at 21:31

## 2024-05-30 RX ADMIN — FOLIC ACID 1 MG: 1 TABLET ORAL at 08:32

## 2024-05-30 RX ADMIN — MIRTAZAPINE 15 MG: 15 TABLET, FILM COATED ORAL at 21:31

## 2024-05-30 RX ADMIN — TRAZODONE HYDROCHLORIDE 50 MG: 50 TABLET ORAL at 22:28

## 2024-05-30 RX ADMIN — HYDROXYZINE PAMOATE 50 MG: 50 CAPSULE ORAL at 09:38

## 2024-05-30 RX ADMIN — HYDROXYZINE PAMOATE 50 MG: 50 CAPSULE ORAL at 18:01

## 2024-05-30 RX ADMIN — PALIPERIDONE 9 MG: 9 TABLET, EXTENDED RELEASE ORAL at 08:32

## 2024-05-30 NOTE — GROUP NOTE
Date: 5/30/2024    Group Start Time: 1245  Group End Time: 1330  Group Topic: Music Therapy    [facility] [place] 3W Karine Espinoza    Active Music Making, Live Music Listening, and Music Reminiscence  Patients will be given a songbook and offered the opportunity to select (a) song(s) of their choice for live music listening on [employer]. Patients will be encouraged to sing along, move along, and listen to the music.     Focus: Self-Expression, Processing, Self-Esteem, Coping Skills, and Building Positive Experiences    Goals: Improve Mood, Decrease Isolation, Increase Sense of Community/Socialization, Improve Self-Esteem, Increase Self-Expression, Provide Sense of Autonomy, Develop Coping Skills    Patient selected several songs for live music listening and sang/moved to familiar music. Patient was complimentary of MT singing/playing and verbalized support for peers' song choices. Patient appeared brighter compared to previous interactions AEB spontaneously engaging in conversation with peers/staff, smiling, and laughing throughout group. Patient thanked MT for group and expressed interest for future groups.    Attended: 1/2-3/4 attendance  Participation Level: Active Listener and Interactive  Participation Quality: Attentive, Sharing, and Supportive  Affect/Mood: Constricted/Euthymic and Brightens  Speech: spontaneous, normal rate, and normal volume   Thought Content/Processes: Vague  Level of consciousness: Alert  Response: Able to verbalize current knowledge/experience  Outcomes: Progressing towards goal and Actively participated in the group experience    Discipline Responsible: Music Therapist  Signature: Karine Chiang MT-BC, PsychEd Spec

## 2024-05-30 NOTE — GROUP NOTE
Patient did not attend group.    Date: 5/30/2024    Group Start Time: 0930  Group End Time: 1020  Group Topic: Music Therapy    [facility] 3W Karine Espinoza    Rehana Collage  Patients will choose from a variety of pre-selected lyrics to arrange into a collage representing where they are at today / how they are feeling today. Patients will be encouraged to add color and additional words to their collage if they so choose. Patients will have the opportunity to share their collage with the group and reflect on the experience as a whole. Patients will listen to an [facility] playlist comprised of the songs where the pre-selected lyrics originate.    Focus: Creativity, Self-Expression, and Processing    Goals: Improve Mood, Improve Insight/Self-Awareness, Increase Socialization/Community Building, Increase Self-Expression, Improve Attention to Task, Improve Coping Skills/Develop Coping Skills, Increase Sensory Stimulation     Documentation completed by ENRIQUE Chavarria, PsychoEd Spec

## 2024-05-31 PROCEDURE — 1240000000 HC EMOTIONAL WELLNESS R&B

## 2024-05-31 PROCEDURE — 99232 SBSQ HOSP IP/OBS MODERATE 35: CPT | Performed by: PSYCHIATRY & NEUROLOGY

## 2024-05-31 PROCEDURE — 6370000000 HC RX 637 (ALT 250 FOR IP): Performed by: PSYCHIATRY & NEUROLOGY

## 2024-05-31 PROCEDURE — 90833 PSYTX W PT W E/M 30 MIN: CPT | Performed by: PSYCHIATRY & NEUROLOGY

## 2024-05-31 RX ADMIN — PALIPERIDONE 9 MG: 9 TABLET, EXTENDED RELEASE ORAL at 10:05

## 2024-05-31 RX ADMIN — HYDROXYZINE PAMOATE 50 MG: 50 CAPSULE ORAL at 11:50

## 2024-05-31 RX ADMIN — FOLIC ACID 1 MG: 1 TABLET ORAL at 10:05

## 2024-05-31 RX ADMIN — BENZTROPINE MESYLATE 1 MG: 1 TABLET ORAL at 10:05

## 2024-05-31 RX ADMIN — HYDROXYZINE PAMOATE 50 MG: 50 CAPSULE ORAL at 21:04

## 2024-05-31 RX ADMIN — Medication 100 MG: at 10:05

## 2024-05-31 RX ADMIN — MIRTAZAPINE 15 MG: 15 TABLET, FILM COATED ORAL at 21:04

## 2024-05-31 RX ADMIN — BENZTROPINE MESYLATE 1 MG: 1 TABLET ORAL at 21:04

## 2024-05-31 RX ADMIN — ACETAMINOPHEN 325MG 650 MG: 325 TABLET ORAL at 16:10

## 2024-05-31 ASSESSMENT — PAIN SCALES - WONG BAKER: WONGBAKER_NUMERICALRESPONSE: HURTS A LITTLE BIT

## 2024-05-31 ASSESSMENT — PAIN SCALES - GENERAL
PAINLEVEL_OUTOF10: 2
PAINLEVEL_OUTOF10: 4

## 2024-05-31 ASSESSMENT — PAIN DESCRIPTION - LOCATION: LOCATION: TEETH

## 2024-05-31 ASSESSMENT — PAIN DESCRIPTION - DESCRIPTORS: DESCRIPTORS: ACHING

## 2024-05-31 NOTE — GROUP NOTE
Patient did not attend group.    Date: 5/31/2024    Group Start Time: 0930  Group End Time: 1005  Group Topic: Music Therapy    [facility] 3W Karine Espinoza    Songwriting: Rehana Analysis and Substitution  Patients will listen to \"Hand in My Pocket\" by Stephanie Shen and identify positive/negative qualities about themselves as well as things that are in their control and out of their control. Patients will be encouraged to contribute lyrics to a group rehana substitution and discuss the experience as a group. Patients will then listen to \"I Can See Clearly Now\" by Kiko Rossi and fill in a 'Mad Libs' style rehana substitution.    Focus: Creativity, Coping Skills, Self-Esteem, and Self-Expression    Goals: Improve Expressive Communication/Self-Expression, Improve Self-Esteem/Decrease Negative Self-Talk, Improve Mood, Increase Group Cohesion/Socialization, Develop Coping Skills, Improve Self-Awareness/Insight, Increase Attention to Task/Decrease Impulsive Behaviors     Documentation completed by Karine Chiang, MT-BC, PsychoEd Spec

## 2024-05-31 NOTE — CARE COORDINATION
FAMILY COLLATERAL NOTE    Family/Support Name: Brooke    Contact #: [phone]  Relationship to Pt:: Mother    Left message on voice mail for a return call for collateral information.    Family/Support contact aware of hospitalization:Yes    Presenting Symptoms/Current Concerns:  Left her house for 2 week and his girlfriend reports that he had not been sleeping and he had been anxious.    Top 3 Life Stressors:   Drinking alcohol with his medication  Conflictual relationship with girlfriend    Background History Relevant to Current Hospitalization:  2019 in [facility] twice    Family Mental Health/Substance Use History:   Schizophrenia  Bipolar  Cocaine addition  Alcohol and weed addiction.    Support Network's Goal for Hospitalization:   To get his mind right so he can move on with his life and find a job.    Discharge Plan:   Return home with mother/ Micaela    Support Network Supportive of Discharge Plan:   yes    Support can confirm Safety of Location and Security of Weapons:   None    Support agreeable to Safeguard and Monitor Medications (including Prescription and OTC):   Yes  (Mom would like him to have COLES).    Identified Barriers to Compliance with Discharge Plan:   Friends and not being medication compliant.    Recommendations for Support Network:   Available for follow up calls.      SANDOR Dhaliwal   no

## 2024-05-31 NOTE — GROUP NOTE
Date: 5/31/2024    Group Start Time: 1305  Group End Time: 1405  Group Topic: Music Therapy    [facility] [place] [facility] Karine Espinoza    Iso-Principle Playlist Building and Listening  Patients will learn about the \"iso-principle\", or the idea of matching music to your mood in order to gradually change it. Patients will identify how they feel currently and select songs to gradually alter their mood. Patients will select one song from their personal playlist to add to the group playlist. Patients will listen to the group playlist and reflect on how it affected their mood. Patients will discuss the outcomes of the experience and if they could see themselves using it as a coping skill in the future.    Focus: Emotional Awareness/Regulation, Processing, Cognitive Skills, Creativity     Goals: Improve Mood, Improve Insight/Self-Awareness, Improve Self-Expression, Improve Attention to Task, Improve Coping Skills/Develop Coping Skills, Improve Emotion Awareness/Regulation    Patient completed his playlist with assistance from MT. Patient contributed a song that validates your feelings (\"Wicked Games\" by The Weeknd) to the group playlist. Patient sang along to familiar music and shared that his mood gradually improved while listening. Patient mostly kept to himself, but was social when peers initiated conversations about shared music interests (JG; MT).    Attended: Full attendance  Participation Level: Active Listener and Interactive  Participation Quality: Attentive, Sharing, and Supportive  Affect/Mood: Constricted/Euthymic  Speech: normal rate and normal volume   Thought Content/Processes: Disorganized  Level of consciousness: Alert  Response: Able to retain information  Outcomes: Progressing towards goal and Actively participated in the group experience    Discipline Responsible: Music Therapist  Signature: Karine Chiang MT-BC, PsychEd Spec

## 2024-06-01 PROCEDURE — 6370000000 HC RX 637 (ALT 250 FOR IP): Performed by: PSYCHIATRY & NEUROLOGY

## 2024-06-01 PROCEDURE — 99232 SBSQ HOSP IP/OBS MODERATE 35: CPT | Performed by: PSYCHIATRY & NEUROLOGY

## 2024-06-01 PROCEDURE — 1240000000 HC EMOTIONAL WELLNESS R&B

## 2024-06-01 RX ADMIN — FOLIC ACID 1 MG: 1 TABLET ORAL at 08:02

## 2024-06-01 RX ADMIN — HYDROXYZINE PAMOATE 50 MG: 50 CAPSULE ORAL at 12:58

## 2024-06-01 RX ADMIN — BENZTROPINE MESYLATE 1 MG: 1 TABLET ORAL at 20:52

## 2024-06-01 RX ADMIN — TRAZODONE HYDROCHLORIDE 50 MG: 50 TABLET ORAL at 20:53

## 2024-06-01 RX ADMIN — MIRTAZAPINE 15 MG: 15 TABLET, FILM COATED ORAL at 20:52

## 2024-06-01 RX ADMIN — HYDROXYZINE PAMOATE 50 MG: 50 CAPSULE ORAL at 19:01

## 2024-06-01 RX ADMIN — PALIPERIDONE 9 MG: 9 TABLET, EXTENDED RELEASE ORAL at 08:02

## 2024-06-01 RX ADMIN — ACETAMINOPHEN 325MG 650 MG: 325 TABLET ORAL at 08:21

## 2024-06-01 RX ADMIN — BENZTROPINE MESYLATE 1 MG: 1 TABLET ORAL at 08:02

## 2024-06-01 RX ADMIN — Medication 100 MG: at 08:02

## 2024-06-01 ASSESSMENT — PAIN DESCRIPTION - DESCRIPTORS: DESCRIPTORS: ACHING;SHARP

## 2024-06-01 ASSESSMENT — PAIN DESCRIPTION - ORIENTATION: ORIENTATION: LEFT

## 2024-06-01 ASSESSMENT — PAIN DESCRIPTION - PAIN TYPE: TYPE: ACUTE PAIN

## 2024-06-01 ASSESSMENT — PAIN DESCRIPTION - FREQUENCY: FREQUENCY: INTERMITTENT

## 2024-06-01 ASSESSMENT — PAIN SCALES - GENERAL
PAINLEVEL_OUTOF10: 10
PAINLEVEL_OUTOF10: 4

## 2024-06-01 ASSESSMENT — PAIN SCALES - WONG BAKER: WONGBAKER_NUMERICALRESPONSE: HURTS A LITTLE BIT

## 2024-06-01 ASSESSMENT — PAIN DESCRIPTION - LOCATION: LOCATION: TEETH

## 2024-06-01 NOTE — FLOWSHEET NOTE
Pt visible out in the dayroom and social with select peers. Pt reports feeling less paranoid and rates his depression a 3/10 and anxiety a 6/10 on a scale of 1-10 with ten being the worst. Pt is looking forward to his cousin coming to visit today. Pt endorses adequate appetite and adequate sleep. Pt encouraged to shower daily.

## 2024-06-02 PROCEDURE — 90833 PSYTX W PT W E/M 30 MIN: CPT | Performed by: PSYCHIATRY & NEUROLOGY

## 2024-06-02 PROCEDURE — 6370000000 HC RX 637 (ALT 250 FOR IP): Performed by: PSYCHIATRY & NEUROLOGY

## 2024-06-02 PROCEDURE — 99232 SBSQ HOSP IP/OBS MODERATE 35: CPT | Performed by: PSYCHIATRY & NEUROLOGY

## 2024-06-02 PROCEDURE — 1240000000 HC EMOTIONAL WELLNESS R&B

## 2024-06-02 RX ADMIN — BENZTROPINE MESYLATE 1 MG: 1 TABLET ORAL at 20:47

## 2024-06-02 RX ADMIN — Medication 100 MG: at 08:46

## 2024-06-02 RX ADMIN — BENZTROPINE MESYLATE 1 MG: 1 TABLET ORAL at 08:46

## 2024-06-02 RX ADMIN — FOLIC ACID 1 MG: 1 TABLET ORAL at 08:46

## 2024-06-02 RX ADMIN — PALIPERIDONE 9 MG: 9 TABLET, EXTENDED RELEASE ORAL at 08:46

## 2024-06-02 RX ADMIN — ACETAMINOPHEN 325MG 650 MG: 325 TABLET ORAL at 20:52

## 2024-06-02 RX ADMIN — HYDROXYZINE PAMOATE 50 MG: 50 CAPSULE ORAL at 20:47

## 2024-06-02 RX ADMIN — ACETAMINOPHEN 325MG 650 MG: 325 TABLET ORAL at 16:00

## 2024-06-02 RX ADMIN — HYDROXYZINE PAMOATE 50 MG: 50 CAPSULE ORAL at 11:57

## 2024-06-02 ASSESSMENT — PAIN DESCRIPTION - LOCATION
LOCATION: MOUTH
LOCATION: MOUTH

## 2024-06-02 ASSESSMENT — PAIN SCALES - GENERAL
PAINLEVEL_OUTOF10: 6
PAINLEVEL_OUTOF10: 0
PAINLEVEL_OUTOF10: 9

## 2024-06-02 ASSESSMENT — PAIN DESCRIPTION - DESCRIPTORS: DESCRIPTORS: ACHING

## 2024-06-02 ASSESSMENT — PAIN DESCRIPTION - ORIENTATION
ORIENTATION: RIGHT;LEFT
ORIENTATION: LEFT;RIGHT

## 2024-06-02 NOTE — GROUP NOTE
Group Therapy Note    Date: 6/2/2024    Group Start Time: 1700  Group End Time: 1730  Group Topic: Community Meeting    [facility] [place] 3W Natasha Denney    Pts were informed about unit guidelines. Pts were made aware of which teams they are on, blue or green and who their nurses are. Visitation policy was addressed as well as unit orientation. Pts were reminded to maintain daily hygiene and to take advantage of sensory room and other materials offered.     Group Therapy Note    Attendees: 12/19       Patient's Goal:  \"To control myself more.\"     Notes:  Pt attended the community meeting.    Modes of Intervention: Education      Discipline Responsible: Behavorial Health Tech      Signature:  Natasha Vanegas

## 2024-06-02 NOTE — FLOWSHEET NOTE
Pt visible out on the unit and walks wit a strong and steady gait.Maintains good eye contact with brighter affect. Appears less guarded and suspicious today. Hopeful for D/C soon. Future oriented and talks about needing to get a job and his drivers license soon. Pt rates his anxiety and depression a 6/10 on a scale of 1-10 with ten being the worst. Pt denies suicidal ideation,intent or plan. Pt denies HI/AVH.

## 2024-06-02 NOTE — GROUP NOTE
Group Therapy Note    Date: 6/1/2024    Group Start Time: 2000  Group End Time: 2030  Group Topic: Wrap-Up    [facility] 3W Natasha Denney    Patients concluded their night with wrap up group, which consisted of a friendly wii sports game night. Patients chose the games they wanted to play, when they were not playing, patients socialized as a group, as well as watched and supported their peers that were actively playing the games.     Group Therapy Note    Attendees: 7/15       Patient's Goal:  \"To get a new job and save up for a car.\"     Notes:  Pt attended and participated in tonight's wrap up group. Pt chose to play go carts verses peer (DHIRAJ). Pt shared laughs with peer as they tried to navigate the remote, as it was nostalgic for them. Pt remained in group for the whole time, although he only played his one turn. He stayed in group and socialized with peer. Pt seemed to enjoy his conversation, it seemed meaningful to the two patients. Pt was a lot more oriented in my interaction with him today in group than in the past, pt seemed more comfortable and less anxious, AEB body language, speech, remaining in group, and orienting to the game.     Status After Intervention:  Improved    Participation Level: Active Listener and Interactive    Participation Quality: Appropriate, Attentive, Sharing, and Supportive      Speech:  normal      Thought Process/Content: Logical      Affective Functioning: Congruent      Mood: happy      Level of consciousness:  Alert and Attentive      Response to Learning: Able to verbalize current knowledge/experience      Endings: None Reported    Modes of Intervention: Activity      Discipline Responsible: Behavorial Health Tech      Signature:  Natasha Vanegas

## 2024-06-03 VITALS
OXYGEN SATURATION: 100 % | WEIGHT: 130 LBS | BODY MASS INDEX: 22.2 KG/M2 | RESPIRATION RATE: 20 BRPM | HEART RATE: 97 BPM | HEIGHT: 64 IN | DIASTOLIC BLOOD PRESSURE: 81 MMHG | SYSTOLIC BLOOD PRESSURE: 122 MMHG | TEMPERATURE: 98.2 F

## 2024-06-03 PROCEDURE — 6370000000 HC RX 637 (ALT 250 FOR IP): Performed by: PSYCHIATRY & NEUROLOGY

## 2024-06-03 PROCEDURE — 99239 HOSP IP/OBS DSCHRG MGMT >30: CPT | Performed by: PSYCHIATRY & NEUROLOGY

## 2024-06-03 RX ORDER — PALIPERIDONE 9 MG/1
9 TABLET, EXTENDED RELEASE ORAL DAILY
Qty: 15 TABLET | Refills: 3 | Status: SHIPPED | OUTPATIENT
Start: 2024-06-04

## 2024-06-03 RX ORDER — HYDROXYZINE PAMOATE 25 MG/1
25 CAPSULE ORAL 2 TIMES DAILY PRN
Qty: 30 CAPSULE | Refills: 2 | Status: SHIPPED | OUTPATIENT
Start: 2024-06-03

## 2024-06-03 RX ORDER — BENZTROPINE MESYLATE 1 MG/1
1 TABLET ORAL DAILY
Qty: 15 TABLET | Refills: 3 | Status: SHIPPED | OUTPATIENT
Start: 2024-06-03

## 2024-06-03 RX ADMIN — BENZTROPINE MESYLATE 1 MG: 1 TABLET ORAL at 08:14

## 2024-06-03 RX ADMIN — HYDROXYZINE PAMOATE 50 MG: 50 CAPSULE ORAL at 08:13

## 2024-06-03 RX ADMIN — PALIPERIDONE 9 MG: 9 TABLET, EXTENDED RELEASE ORAL at 08:14

## 2024-06-03 RX ADMIN — FOLIC ACID 1 MG: 1 TABLET ORAL at 08:14

## 2024-06-03 RX ADMIN — Medication 100 MG: at 08:14

## 2024-06-03 NOTE — DISCHARGE INSTRUCTIONS
Due to the Covid-19 Pandemic, [facility] is not currently available. For assistance with quitting smoking please go to https://smokefree.gov. A prescription for an FDA-approved tobacco cessation medication was offered at discharge and the patient refused.    Keep all follow up appointments, take medications as ordered, utilize positive supports, abstain from use of alcohol and drugs. If symptoms return or you feel at risk to yourself or others, please call 911, return the nearest emergency room, or call your local crisis hotline:  [facility]: [phone]  [facility]: [phone]  [facility]: [phone]    Someone from [facility] will be calling you tomorrow to follow up on your care. If you don't hear from us, give us a call! [phone].

## 2024-06-03 NOTE — PLAN OF CARE
Problem: Anxiety  Goal: Will report anxiety at manageable levels  Description: INTERVENTIONS:  1. Administer medication as ordered  2. Teach and rehearse alternative coping skills  3. Provide emotional support with 1:1 interaction with staff  5/27/2024 0740 by Paula De La Torre RN  Outcome: Progressing  5/27/2024 0050 by Parish Long RN  Outcome: Progressing     Problem: Coping  Goal: Pt/Family able to verbalize concerns and demonstrate effective coping strategies  Description: INTERVENTIONS:  1. Assist patient/family to identify coping skills, available support systems and cultural and spiritual values  2. Provide emotional support, including active listening and acknowledgement of concerns of patient and caregivers  3. Reduce environmental stimuli, as able  4. Instruct patient/family in relaxation techniques, as appropriate  5. Assess for spiritual pain/suffering and initiate Spiritual Care, Psychosocial Clinical Specialist consults as needed  5/27/2024 0740 by Paula De La Torre RN  Outcome: Progressing  5/27/2024 0050 by Parish Long RN  Outcome: Progressing     Problem: Decision Making  Goal: Pt/Family able to effectively weigh alternatives and participate in decision making related to treatment and care  Description: INTERVENTIONS:  1. Determine when there are differences between patient's view, family's view, and healthcare provider's view of condition  2. Facilitate patient and family articulation of goals for care  3. Help patient and family identify pros/cons of alternative solutions  4. Provide information as requested by patient/family  5. Respect patient/family right to receive or not to receive information  6. Serve as a liaison between patient and family and health care team  7. Initiate Consults from Ethics, Palliative Care or initiate Family Care Conference as is appropriate  5/27/2024 0740 by Paula De La Torre RN  Outcome: Progressing  5/27/2024 0050 by Parish Long RN  Outcome: 
  Problem: Anxiety  Goal: Will report anxiety at manageable levels  Description: INTERVENTIONS:  1. Administer medication as ordered  2. Teach and rehearse alternative coping skills  3. Provide emotional support with 1:1 interaction with staff  5/26/2024 0901 by Germaine Lala RN  Outcome: Progressing  Flowsheets (Taken 5/26/2024 0859)  Will report anxiety at manageable levels: Administer medication as ordered  5/25/2024 2110 by Parish Long RN  Outcome: Progressing     Problem: Coping  Goal: Pt/Family able to verbalize concerns and demonstrate effective coping strategies  Description: INTERVENTIONS:  1. Assist patient/family to identify coping skills, available support systems and cultural and spiritual values  2. Provide emotional support, including active listening and acknowledgement of concerns of patient and caregivers  3. Reduce environmental stimuli, as able  4. Instruct patient/family in relaxation techniques, as appropriate  5. Assess for spiritual pain/suffering and initiate Spiritual Care, Psychosocial Clinical Specialist consults as needed  5/26/2024 0901 by Germaine Lala RN  Outcome: Progressing  Flowsheets (Taken 5/26/2024 0859)  Patient/family able to verbalize anxieties, fears, and concerns, and demonstrate effective coping: Provide emotional support, including active listening and acknowledgement of concerns of patient and caregivers  5/25/2024 2110 by Parish Long RN  Outcome: Progressing     Problem: Decision Making  Goal: Pt/Family able to effectively weigh alternatives and participate in decision making related to treatment and care  Description: INTERVENTIONS:  1. Determine when there are differences between patient's view, family's view, and healthcare provider's view of condition  2. Facilitate patient and family articulation of goals for care  3. Help patient and family identify pros/cons of alternative solutions  4. Provide information as requested by patient/family  5. 
  Problem: Anxiety  Goal: Will report anxiety at manageable levels  Description: INTERVENTIONS:  1. Administer medication as ordered  2. Teach and rehearse alternative coping skills  3. Provide emotional support with 1:1 interaction with staff  5/28/2024 0803 by Paula De La Torre RN  Outcome: Progressing  5/27/2024 2329 by Janelle Ortega RN  Outcome: Progressing  Flowsheets (Taken 5/27/2024 1307 by Paula De La Torre RN)  Will report anxiety at manageable levels:   Administer medication as ordered   Provide emotional support with 1:1 interaction with staff   Teach and rehearse alternative coping skills     Problem: Coping  Goal: Pt/Family able to verbalize concerns and demonstrate effective coping strategies  Description: INTERVENTIONS:  1. Assist patient/family to identify coping skills, available support systems and cultural and spiritual values  2. Provide emotional support, including active listening and acknowledgement of concerns of patient and caregivers  3. Reduce environmental stimuli, as able  4. Instruct patient/family in relaxation techniques, as appropriate  5. Assess for spiritual pain/suffering and initiate Spiritual Care, Psychosocial Clinical Specialist consults as needed  5/28/2024 0803 by Paula De La Torre RN  Outcome: Progressing  5/27/2024 2329 by Janelle Ortega RN  Outcome: Progressing  Flowsheets (Taken 5/27/2024 1307 by Paula De La Torre RN)  Patient/family able to verbalize anxieties, fears, and concerns, and demonstrate effective coping:   Assist patient/family to identify coping skills, available support systems and cultural and spiritual values   Provide emotional support, including active listening and acknowledgement of concerns of patient and caregivers   Reduce environmental stimuli, as able     Problem: Decision Making  Goal: Pt/Family able to effectively weigh alternatives and participate in decision making related to treatment and care  Description: INTERVENTIONS:  1. Determine 
  Problem: Anxiety  Goal: Will report anxiety at manageable levels  Description: INTERVENTIONS:  1. Administer medication as ordered  2. Teach and rehearse alternative coping skills  3. Provide emotional support with 1:1 interaction with staff  6/1/2024 0733 by Paula De La Torre RN  Outcome: Progressing  6/1/2024 0053 by Amanda Jain RN  Outcome: Progressing     Problem: Coping  Goal: Pt/Family able to verbalize concerns and demonstrate effective coping strategies  Description: INTERVENTIONS:  1. Assist patient/family to identify coping skills, available support systems and cultural and spiritual values  2. Provide emotional support, including active listening and acknowledgement of concerns of patient and caregivers  3. Reduce environmental stimuli, as able  4. Instruct patient/family in relaxation techniques, as appropriate  5. Assess for spiritual pain/suffering and initiate Spiritual Care, Psychosocial Clinical Specialist consults as needed  6/1/2024 0733 by Paula De La Torre RN  Outcome: Progressing  6/1/2024 0053 by Amanda Jain RN  Outcome: Progressing     Problem: Decision Making  Goal: Pt/Family able to effectively weigh alternatives and participate in decision making related to treatment and care  Description: INTERVENTIONS:  1. Determine when there are differences between patient's view, family's view, and healthcare provider's view of condition  2. Facilitate patient and family articulation of goals for care  3. Help patient and family identify pros/cons of alternative solutions  4. Provide information as requested by patient/family  5. Respect patient/family right to receive or not to receive information  6. Serve as a liaison between patient and family and health care team  7. Initiate Consults from Ethics, Palliative Care or initiate Family Care Conference as is appropriate  6/1/2024 0733 by Paula De La Torre RN  Outcome: Progressing  6/1/2024 0053 by Amanda Jain RN  Outcome: Progressing     Problem: 
  Problem: Anxiety  Goal: Will report anxiety at manageable levels  Description: INTERVENTIONS:  1. Administer medication as ordered  2. Teach and rehearse alternative coping skills  3. Provide emotional support with 1:1 interaction with staff  6/2/2024 2025 by Deborah Castillo RN  Outcome: Progressing  6/2/2024 1036 by Debby Molina RN  Outcome: Progressing  Flowsheets  Taken 6/2/2024 1036  Will report anxiety at manageable levels:   Administer medication as ordered   Provide emotional support with 1:1 interaction with staff  Taken 6/2/2024 1031  Will report anxiety at manageable levels:   Administer medication as ordered   Provide emotional support with 1:1 interaction with staff  6/2/2024 0745 by Paula De La Torre RN  Outcome: Progressing     Problem: Coping  Goal: Pt/Family able to verbalize concerns and demonstrate effective coping strategies  Description: INTERVENTIONS:  1. Assist patient/family to identify coping skills, available support systems and cultural and spiritual values  2. Provide emotional support, including active listening and acknowledgement of concerns of patient and caregivers  3. Reduce environmental stimuli, as able  4. Instruct patient/family in relaxation techniques, as appropriate  5. Assess for spiritual pain/suffering and initiate Spiritual Care, Psychosocial Clinical Specialist consults as needed  6/2/2024 2025 by Deborah Castillo RN  Outcome: Progressing  6/2/2024 1036 by Debby Molina RN  Outcome: Progressing  Flowsheets (Taken 6/2/2024 1031)  Patient/family able to verbalize anxieties, fears, and concerns, and demonstrate effective coping:   Assist patient/family to identify coping skills, available support systems and cultural and spiritual values   Provide emotional support, including active listening and acknowledgement of concerns of patient and caregivers  6/2/2024 0745 by Paula De La Torre RN  Outcome: Progressing     Problem: Decision Making  Goal: Pt/Family able to 
  Problem: Anxiety  Goal: Will report anxiety at manageable levels  Description: INTERVENTIONS:  1. Administer medication as ordered  2. Teach and rehearse alternative coping skills  3. Provide emotional support with 1:1 interaction with staff  Outcome: Progressing  Flowsheets (Taken 5/30/2024 1320)  Will report anxiety at manageable levels: Provide emotional support with 1:1 interaction with staff     Problem: Coping  Goal: Pt/Family able to verbalize concerns and demonstrate effective coping strategies  Description: INTERVENTIONS:  1. Assist patient/family to identify coping skills, available support systems and cultural and spiritual values  2. Provide emotional support, including active listening and acknowledgement of concerns of patient and caregivers  3. Reduce environmental stimuli, as able  4. Instruct patient/family in relaxation techniques, as appropriate  5. Assess for spiritual pain/suffering and initiate Spiritual Care, Psychosocial Clinical Specialist consults as needed  Outcome: Progressing  Flowsheets (Taken 5/30/2024 1320)  Patient/family able to verbalize anxieties, fears, and concerns, and demonstrate effective coping: Reduce environmental stimuli, as able     Problem: Decision Making  Goal: Pt/Family able to effectively weigh alternatives and participate in decision making related to treatment and care  Description: INTERVENTIONS:  1. Determine when there are differences between patient's view, family's view, and healthcare provider's view of condition  2. Facilitate patient and family articulation of goals for care  3. Help patient and family identify pros/cons of alternative solutions  4. Provide information as requested by patient/family  5. Respect patient/family right to receive or not to receive information  6. Serve as a liaison between patient and family and health care team  7. Initiate Consults from Ethics, Palliative Care or initiate Family Care Conference as is appropriate  Outcome: 
  Problem: Anxiety  Goal: Will report anxiety at manageable levels  Description: INTERVENTIONS:  1. Administer medication as ordered  2. Teach and rehearse alternative coping skills  3. Provide emotional support with 1:1 interaction with staff  Outcome: Progressing  Flowsheets (Taken 5/31/2024 1457)  Will report anxiety at manageable levels:   Administer medication as ordered   Teach and rehearse alternative coping skills   Provide emotional support with 1:1 interaction with staff     Problem: Coping  Goal: Pt/Family able to verbalize concerns and demonstrate effective coping strategies  Description: INTERVENTIONS:  1. Assist patient/family to identify coping skills, available support systems and cultural and spiritual values  2. Provide emotional support, including active listening and acknowledgement of concerns of patient and caregivers  3. Reduce environmental stimuli, as able  4. Instruct patient/family in relaxation techniques, as appropriate  5. Assess for spiritual pain/suffering and initiate Spiritual Care, Psychosocial Clinical Specialist consults as needed  Outcome: Progressing  Flowsheets (Taken 5/31/2024 1457)  Patient/family able to verbalize anxieties, fears, and concerns, and demonstrate effective coping:   Reduce environmental stimuli, as able   Provide emotional support, including active listening and acknowledgement of concerns of patient and caregivers     Problem: Decision Making  Goal: Pt/Family able to effectively weigh alternatives and participate in decision making related to treatment and care  Description: INTERVENTIONS:  1. Determine when there are differences between patient's view, family's view, and healthcare provider's view of condition  2. Facilitate patient and family articulation of goals for care  3. Help patient and family identify pros/cons of alternative solutions  4. Provide information as requested by patient/family  5. Respect patient/family right to receive or not to 
Patient is in and out of his room. Flat affect. Not social this evening. Slow to respond. Suspicious and guarded. Rates anxiety 8/10. Denies depression. Pt reports hearing voices this morning but currently denies SI/HI and AVH. Denies further needs at this time   Problem: Anxiety  Goal: Will report anxiety at manageable levels  Description: INTERVENTIONS:  1. Administer medication as ordered  2. Teach and rehearse alternative coping skills  3. Provide emotional support with 1:1 interaction with staff  5/28/2024 2007 by Michelle Varma RN  Outcome: Progressing  Flowsheets (Taken 5/28/2024 0926 by Paula De La Torre, RN)  Will report anxiety at manageable levels:   Administer medication as ordered   Provide emotional support with 1:1 interaction with staff   Teach and rehearse alternative coping skills  5/28/2024 0803 by Paula De La Torre RN  Outcome: Progressing     Problem: Coping  Goal: Pt/Family able to verbalize concerns and demonstrate effective coping strategies  Description: INTERVENTIONS:  1. Assist patient/family to identify coping skills, available support systems and cultural and spiritual values  2. Provide emotional support, including active listening and acknowledgement of concerns of patient and caregivers  3. Reduce environmental stimuli, as able  4. Instruct patient/family in relaxation techniques, as appropriate  5. Assess for spiritual pain/suffering and initiate Spiritual Care, Psychosocial Clinical Specialist consults as needed  5/28/2024 2007 by Michelle Varma, RN  Outcome: Progressing  Flowsheets (Taken 5/28/2024 0926 by Paula De La Torre, RN)  Patient/family able to verbalize anxieties, fears, and concerns, and demonstrate effective coping:   Assist patient/family to identify coping skills, available support systems and cultural and spiritual values   Provide emotional support, including active listening and acknowledgement of concerns of patient and caregivers   Reduce environmental stimuli, as able   
Patient up ad [place] guarded and suspicious , pressing on unit exit door overtly attempting to escape. Patient scaled nurse station divider and entered nursing station. Requiring activation of CIT and subsequent PRN medication. Patient remains confused and disorganized.   Problem: Anxiety  Goal: Will report anxiety at manageable levels  Description: INTERVENTIONS:  1. Administer medication as ordered  2. Teach and rehearse alternative coping skills  3. Provide emotional support with 1:1 interaction with staff  5/25/2024 2110 by Parish Long RN  Outcome: Progressing     Problem: Coping  Goal: Pt/Family able to verbalize concerns and demonstrate effective coping strategies  Description: INTERVENTIONS:  1. Assist patient/family to identify coping skills, available support systems and cultural and spiritual values  2. Provide emotional support, including active listening and acknowledgement of concerns of patient and caregivers  3. Reduce environmental stimuli, as able  4. Instruct patient/family in relaxation techniques, as appropriate  5. Assess for spiritual pain/suffering and initiate Spiritual Care, Psychosocial Clinical Specialist consults as needed  5/25/2024 2110 by Parish Long RN  Outcome: Progressing     Problem: Decision Making  Goal: Pt/Family able to effectively weigh alternatives and participate in decision making related to treatment and care  Description: INTERVENTIONS:  1. Determine when there are differences between patient's view, family's view, and healthcare provider's view of condition  2. Facilitate patient and family articulation of goals for care  3. Help patient and family identify pros/cons of alternative solutions  4. Provide information as requested by patient/family  5. Respect patient/family right to receive or not to receive information  6. Serve as a liaison between patient and family and health care team  7. Initiate Consults from Ethics, Palliative Care or initiate Family Care 
Pt at nurses station making casual conversation. Admits he still has paranoid thoughts from time to time. Hopeful medication adjustment will help. Plans to follow up with [facility] after D/C.     Problem: Anxiety  Goal: Will report anxiety at manageable levels  Description: INTERVENTIONS:  1. Administer medication as ordered  2. Teach and rehearse alternative coping skills  3. Provide emotional support with 1:1 interaction with staff  Outcome: Progressing  Flowsheets  Taken 5/29/2024 1138  Will report anxiety at manageable levels:   Administer medication as ordered   Provide emotional support with 1:1 interaction with staff  Taken 5/29/2024 1132  Will report anxiety at manageable levels:   Administer medication as ordered   Provide emotional support with 1:1 interaction with staff     Problem: Coping  Goal: Pt/Family able to verbalize concerns and demonstrate effective coping strategies  Description: INTERVENTIONS:  1. Assist patient/family to identify coping skills, available support systems and cultural and spiritual values  2. Provide emotional support, including active listening and acknowledgement of concerns of patient and caregivers  3. Reduce environmental stimuli, as able  4. Instruct patient/family in relaxation techniques, as appropriate  5. Assess for spiritual pain/suffering and initiate Spiritual Care, Psychosocial Clinical Specialist consults as needed  Outcome: Progressing  Flowsheets (Taken 5/29/2024 1132)  Patient/family able to verbalize anxieties, fears, and concerns, and demonstrate effective coping:   Assist patient/family to identify coping skills, available support systems and cultural and spiritual values   Provide emotional support, including active listening and acknowledgement of concerns of patient and caregivers     Problem: Decision Making  Goal: Pt/Family able to effectively weigh alternatives and participate in decision making related to treatment and care  Description: INTERVENTIONS:  1. 
care team  7. Initiate Consults from Ethics, Palliative Care or initiate Family Care Conference as is appropriate  Outcome: Not Progressing     Problem: Confusion  Goal: Confusion, delirium, dementia, or psychosis is improved or at baseline  Description: INTERVENTIONS:  1. Assess for possible contributors to thought disturbance, including medications, impaired vision or hearing, underlying metabolic abnormalities, dehydration, psychiatric diagnoses, and notify attending LIP  2. [place] high risk fall precautions, as indicated  3. Provide frequent short contacts to provide reality reorientation, refocusing and direction  4. Decrease environmental stimuli, including noise as appropriate  5. Monitor and intervene to maintain adequate nutrition, hydration, elimination, sleep and activity  6. If unable to ensure safety without constant attention obtain sitter and review sitter guidelines with assigned personnel  7. Initiate Psychosocial CNS and Spiritual Care consult, as indicated  Outcome: Not Progressing     Problem: Behavior  Goal: Pt/Family maintain appropriate behavior and adhere to behavioral management agreement, if implemented  Description: INTERVENTIONS:  1. Assess patient/family's coping skills and  non-compliant behavior (including use of illegal substances)  2. Notify security of behavior or suspected illegal substances which indicate the need for search of the family and/or belongings  3. Encourage verbalization of thoughts and concerns in a socially appropriate manner  4. Utilize positive, consistent limit setting strategies supporting safety of patient, staff and others  5. Encourage participation in the decision making process about the behavioral management agreement  6. If a visitor's behavior poses a threat to safety call refer to organization policy.  7. Initiate consult with , Psychosocial CNS, Spiritual Care as appropriate  Outcome: Not Progressing     Problem: Depression/Self 
RN  Outcome: Progressing  Flowsheets (Taken 6/1/2024 0907 by Paula De La Torre, RN)  Effect of thought disturbance (confusion, delirium, dementia, or psychosis) are managed with adequate functional status:   Assess for contributors to thought disturbance, including medications, impaired vision or hearing, underlying metabolic abnormalities, dehydration, psychiatric diagnoses, notify LIP   [place] high risk fall precautions, as indicated     Problem: Behavior  Goal: Pt/Family maintain appropriate behavior and adhere to behavioral management agreement, if implemented  Description: INTERVENTIONS:  1. Assess patient/family's coping skills and  non-compliant behavior (including use of illegal substances)  2. Notify security of behavior or suspected illegal substances which indicate the need for search of the family and/or belongings  3. Encourage verbalization of thoughts and concerns in a socially appropriate manner  4. Utilize positive, consistent limit setting strategies supporting safety of patient, staff and others  5. Encourage participation in the decision making process about the behavioral management agreement  6. If a visitor's behavior poses a threat to safety call refer to organization policy.  7. Initiate consult with , Psychosocial CNS, Spiritual Care as appropriate  6/2/2024 0745 by Paula De La Torre, RN  Outcome: Progressing  6/1/2024 2129 by Deborah Castillo RN  Outcome: Progressing  Flowsheets (Taken 6/1/2024 0907 by Paula De La Torre, RN)  Patient/family maintains appropriate behavior and adheres to behavioral management agreement, if implemented:   Assess patient/family’s coping skills and  non-compliant behavior (including use of illegal substances)   Notify security of behavior or suspected illegal substances which indicate the need for search of the patient and/or belongings     Problem: Depression/Self Harm  Goal: Effect of psychiatric condition will be minimized and patient will be protected from 
appropriate     Problem: Behavior  Goal: Pt/Family maintain appropriate behavior and adhere to behavioral management agreement, if implemented  Description: INTERVENTIONS:  1. Assess patient/family's coping skills and  non-compliant behavior (including use of illegal substances)  2. Notify security of behavior or suspected illegal substances which indicate the need for search of the family and/or belongings  3. Encourage verbalization of thoughts and concerns in a socially appropriate manner  4. Utilize positive, consistent limit setting strategies supporting safety of patient, staff and others  5. Encourage participation in the decision making process about the behavioral management agreement  6. If a visitor's behavior poses a threat to safety call refer to organization policy.  7. Initiate consult with , Psychosocial CNS, Spiritual Care as appropriate  5/30/2024 2041 by Michelle Varma, RN  Outcome: Progressing  5/30/2024 1322 by Karen Jara RN  Outcome: Progressing     Problem: Depression/Self Harm  Goal: Effect of psychiatric condition will be minimized and patient will be protected from self harm  Description: INTERVENTIONS:  1. Assess impact of patient's symptoms on level of functioning, self care needs and offer support as indicated  2. Assess patient/family knowledge of depression, impact on illness and need for teaching  3. Provide emotional support, presence and reassurance  4. Assess for possible suicidal thoughts or ideation. If patient expresses suicidal thoughts or statements do not leave alone, initiate Suicide Precautions, move to a room close to the nursing station and obtain sitter  5. Initiate consults as appropriate with Mental Health Professional, Spiritual Care, Psychosocial CNS, and consider a recommendation to the [facility]IP for a Psychiatric Consultation  5/30/2024 2041 by Michelle Varma, RN  Outcome: Progressing  Flowsheets (Taken 5/30/2024 2040)  Effect of psychiatric 
hydration, elimination, sleep and activity  6. If unable to ensure safety without constant attention obtain sitter and review sitter guidelines with assigned personnel  7. Initiate Psychosocial CNS and Spiritual Care consult, as indicated  5/29/2024 2152 by Michelle Varma RN  Outcome: Progressing  5/29/2024 1138 by Debby Molina RN  Outcome: Progressing  Flowsheets (Taken 5/29/2024 1132)  Effect of thought disturbance (confusion, delirium, dementia, or psychosis) are managed with adequate functional status:   Assess for contributors to thought disturbance, including medications, impaired vision or hearing, underlying metabolic abnormalities, dehydration, psychiatric diagnoses, notify LIP   [place] high risk fall precautions, as indicated     Problem: Behavior  Goal: Pt/Family maintain appropriate behavior and adhere to behavioral management agreement, if implemented  Description: INTERVENTIONS:  1. Assess patient/family's coping skills and  non-compliant behavior (including use of illegal substances)  2. Notify security of behavior or suspected illegal substances which indicate the need for search of the family and/or belongings  3. Encourage verbalization of thoughts and concerns in a socially appropriate manner  4. Utilize positive, consistent limit setting strategies supporting safety of patient, staff and others  5. Encourage participation in the decision making process about the behavioral management agreement  6. If a visitor's behavior poses a threat to safety call refer to organization policy.  7. Initiate consult with , Psychosocial CNS, Spiritual Care as appropriate  5/29/2024 2152 by Michelle Varma RN  Outcome: Progressing  5/29/2024 1138 by Debby Molina RN  Outcome: Progressing  Flowsheets (Taken 5/29/2024 1132)  Patient/family maintains appropriate behavior and adheres to behavioral management agreement, if implemented: Assess patient/family’s coping skills and  
safety without constant attention obtain sitter and review sitter guidelines with assigned personnel  7. Initiate Psychosocial CNS and Spiritual Care consult, as indicated  6/1/2024 0053 by Amanda Jain RN  Outcome: Progressing  5/31/2024 1713 by Sonu Castillo RN  Outcome: Progressing  Flowsheets (Taken 5/31/2024 1457)  Effect of thought disturbance (confusion, delirium, dementia, or psychosis) are managed with adequate functional status: Decrease environmental stimuli, including noise as appropriate     Problem: Behavior  Goal: Pt/Family maintain appropriate behavior and adhere to behavioral management agreement, if implemented  Description: INTERVENTIONS:  1. Assess patient/family's coping skills and  non-compliant behavior (including use of illegal substances)  2. Notify security of behavior or suspected illegal substances which indicate the need for search of the family and/or belongings  3. Encourage verbalization of thoughts and concerns in a socially appropriate manner  4. Utilize positive, consistent limit setting strategies supporting safety of patient, staff and others  5. Encourage participation in the decision making process about the behavioral management agreement  6. If a visitor's behavior poses a threat to safety call refer to organization policy.  7. Initiate consult with , Psychosocial CNS, Spiritual Care as appropriate  6/1/2024 0053 by Amanda Jain RN  Outcome: Progressing  5/31/2024 1713 by Sonu Castillo RN  Outcome: Progressing  Flowsheets (Taken 5/31/2024 1457)  Patient/family maintains appropriate behavior and adheres to behavioral management agreement, if implemented:   Assess patient/family’s coping skills and  non-compliant behavior (including use of illegal substances)   Utilize positive, consistent limit setting strategies supporting safety of patient, staff and others     Problem: Depression/Self Harm  Goal: Effect of psychiatric condition will be 
symptoms on level of functioning, self care needs and offer support as indicated  2. Assess patient/family knowledge of depression, impact on illness and need for teaching  3. Provide emotional support, presence and reassurance  4. Assess for possible suicidal thoughts or ideation. If patient expresses suicidal thoughts or statements do not leave alone, initiate Suicide Precautions, move to a room close to the nursing station and obtain sitter  5. Initiate consults as appropriate with Mental Health Professional, Spiritual Care, Psychosocial CNS, and consider a recommendation to the LIP for a Psychiatric Consultation  5/27/2024 0050 by Parish Long, RN  Outcome: Progressing     Problem: Discharge Planning  Goal: Discharge to home or other facility with appropriate resources  Outcome: Progressing     Problem: Safety - Adult  Goal: Free from fall injury  Outcome: Progressing     
coping skills and  non-compliant behavior (including use of illegal substances)   Utilize positive, consistent limit setting strategies supporting safety of patient, staff and others   Encourage verbalization of thoughts and concerns in a socially appropriate manner     Problem: Depression/Self Harm  Goal: Effect of psychiatric condition will be minimized and patient will be protected from self harm  Description: INTERVENTIONS:  1. Assess impact of patient's symptoms on level of functioning, self care needs and offer support as indicated  2. Assess patient/family knowledge of depression, impact on illness and need for teaching  3. Provide emotional support, presence and reassurance  4. Assess for possible suicidal thoughts or ideation. If patient expresses suicidal thoughts or statements do not leave alone, initiate Suicide Precautions, move to a room close to the nursing station and obtain sitter  5. Initiate consults as appropriate with Mental Health Professional, Spiritual Care, Psychosocial CNS, and consider a recommendation to the [facility] for a Psychiatric Consultation  Outcome: Progressing  Flowsheets (Taken 5/27/2024 1307 by Paula De La Torre, RN)  Effect of psychiatric condition will be minimized and patient will be protected from self harm:   Assess impact of patient’s symptoms on level of functioning, self care needs and offer support as indicated   Assess patient/family knowledge of depression, impact on illness and need for teaching     Problem: Discharge Planning  Goal: Discharge to home or other facility with appropriate resources  Outcome: Progressing  Flowsheets (Taken 5/27/2024 1307 by Paula De La Torre, RN)  Discharge to home or other facility with appropriate resources:   Identify barriers to discharge with patient and caregiver   Arrange for needed discharge resources and transportation as appropriate     Problem: Safety - Adult  Goal: Free from fall injury  Outcome: Progressing     
- Adult  Goal: Free from fall injury  6/1/2024 2129 by Deborah Castillo, RN  Outcome: Progressing  6/1/2024 0733 by Paula De La Torre, RN  Outcome: Progressing     Problem: Pain  Goal: Verbalizes/displays adequate comfort level or baseline comfort level  Outcome: Progressing     
knowledge of depression, impact on illness and need for teaching  6/2/2024 0745 by Paula De La Torre RN  Outcome: Progressing  6/1/2024 2129 by Deborah Castillo RN  Outcome: Progressing  Flowsheets (Taken 6/1/2024 0907 by Paula De La Torre, RN)  Effect of psychiatric condition will be minimized and patient will be protected from self harm:   Assess impact of patient’s symptoms on level of functioning, self care needs and offer support as indicated   Assess patient/family knowledge of depression, impact on illness and need for teaching     Problem: Discharge Planning  Goal: Discharge to home or other facility with appropriate resources  6/2/2024 1036 by Debby Molina RN  Outcome: Progressing  Flowsheets (Taken 6/1/2024 0907 by Paula De La Torre, RN)  Discharge to home or other facility with appropriate resources:   Identify barriers to discharge with patient and caregiver   Identify discharge learning needs (meds, wound care, etc)   Refer to discharge planning if patient needs post-hospital services based on physician order or complex needs related to functional status, cognitive ability or social support system   Arrange for needed discharge resources and transportation as appropriate   Arrange for interpreters to assist at discharge as needed  6/2/2024 0745 by Paula De La Torre RN  Outcome: Progressing  6/1/2024 2129 by Deborah Castillo RN  Outcome: Progressing  Flowsheets (Taken 6/1/2024 0907 by Paula De La Torre, RN)  Discharge to home or other facility with appropriate resources:   Identify barriers to discharge with patient and caregiver   Identify discharge learning needs (meds, wound care, etc)   Refer to discharge planning if patient needs post-hospital services based on physician order or complex needs related to functional status, cognitive ability or social support system   Arrange for needed discharge resources and transportation as appropriate   Arrange for interpreters to assist at discharge as needed     Problem: Safety

## 2024-06-03 NOTE — TRANSITION OF CARE
Behavioral Health Transition Record    Patient Name: Hugo Nassar  YOB: 2000   Medical Record Number: [id number]  Date of Admission: 5/25/2024 11:01 AM   Date of Discharge: 6/3/2024    Attending Provider: Elvis Hobosn MD   Discharging Provider: Elvis Hobson MD  To contact this individual call [phone] and ask the  to page.  If unavailable, ask to be transferred to Behavioral Health Provider on call.  A Behavioral Health Provider will be available on call 24/7 and during holidays.    Primary Care Provider: Uriel Irizarry MD    No Known Allergies    Reason for Admission: Mr. Hugo Nassar is a 23 y.o. male with a history of Schizophrenia, who was brought to the ER by his mother for a \"mental breakdown\". Per ER note, \"Patient presented to the ED accompanied by his Mom for having a \"mental break.\" Reported from his Mom that he has been off of his medications for the past 2 weeks.     Admission Diagnosis: Schizophrenia, unspecified type (HCC) [F20.9]  Schizophrenia (HCC) [F20.9]    * No surgery found *    Results for orders placed or performed during the hospital encounter of 05/25/24   Acetaminophen Level   Result Value Ref Range    Acetaminophen Level <5 (L) 10 - 30 ug/mL   CBC with Auto Differential   Result Value Ref Range    WBC 9.2 4.8 - 10.8 K/uL    RBC 4.51 (L) 4.70 - 6.10 M/uL    Hemoglobin 14.4 14.0 - 18.0 g/dL    Hematocrit 41.1 (L) 42.0 - 52.0 %    MCV 91.1 79.0 - 92.2 fL    MCH 31.9 (H) 27.0 - 31.3 pg    MCHC 35.0 33.0 - 37.0 %    RDW 12.6 11.5 - 14.5 %    Platelets 276 130 - 400 K/uL    Neutrophils % 79.7 %    Lymphocytes % 9.2 %    Monocytes % 10.5 %    Eosinophils % 0.1 %    Basophils % 0.2 %    Neutrophils Absolute 7.3 (H) 1.4 - 6.5 K/uL    Lymphocytes Absolute 0.8 (L) 1.0 - 4.8 K/uL    Monocytes Absolute 1.0 (H) 0.2 - 0.8 K/uL    Eosinophils Absolute 0.0 0.0 - 0.7 K/uL    Basophils Absolute 0.0 0.0 - 0.2 K/uL   CK   Result Value Ref Range    Total  (H)

## 2024-06-03 NOTE — DISCHARGE SUMMARY
hours.  No results for input(s): \"BILITOT\", \"ALKPHOS\", \"AST\", \"ALT\" in the last 72 hours.  Lab Results   Component Value Date/Time    BARBSCNU Neg 05/25/2024 11:13 AM    LABBENZ Neg 05/25/2024 11:13 AM    LABMETH Neg 05/25/2024 11:13 AM    ETOH <10 05/25/2024 11:18 AM     Lab Results   Component Value Date/Time    TSH 1.150 05/25/2024 11:18 AM    TSH 1.540 10/28/2019 03:39 PM     No results found for: \"LITHIUM\"  No results found for: \"VALPROATE\", \"CBMZ\"    RISK ASSESSMENT AT DISCHARGE: Low risk for suicide and homicide.     Treatment Plan:  Reviewed current Medications with the patient. Education provided on the complaince with treatment.    Risks, benefits, side effects, drug-to-drug interactions and alternatives to treatment were discussed.    Encourage patient to attend outpatient follow up appointment and therapy.    Patient was advised to call the outpatient provider, visit the nearest ED or call 911 if symptoms are not manageable.     Patient's family member was contacted prior to the discharge.         Medication List        START taking these medications      benztropine 1 MG tablet  Commonly known as: COGENTIN  Take 1 tablet by mouth daily     hydrOXYzine pamoate 25 MG capsule  Commonly known as: VISTARIL  Take 1 capsule by mouth 2 times daily as needed for Anxiety            CHANGE how you take these medications      paliperidone 9 MG extended release tablet  Commonly known as: INVEGA  Take 1 tablet by mouth daily  Start taking on: June 4, 2024  What changed:   medication strength  how much to take            CONTINUE taking these medications      mirtazapine 15 MG tablet  Commonly known as: REMERON               Where to Get Your Medications        These medications were sent to [facility] - [place], [place] - [address] [phone] - F [phone]  [address] [place] [address] [place] [address]      Phone: [phone]   benztropine 1 MG tablet  hydrOXYzine pamoate 25 MG capsule  paliperidone

## 2024-06-03 NOTE — DISCHARGE INSTR - DIET

## 2024-06-03 NOTE — PROGRESS NOTES
Pt out on unit, but not social with peers, flat blunt affect. Pt labile, suspicious, impulsive, impaired concentration, paranoid, distractible, disorganized thoughts, requires redirection, provided.Pt reports showering today.Pt reports good appetite.Pt reports poor sleep, due to trouble falling asleep,staying asleep. Pt rates anxiety 2/10.  Pt rates depression 3/10. On a scale from 1 through 10, 10 being the highest. Pt reports attending groups, short intervals.Pt denies SI, HI and A/V hallucinations.Will continue to monitor.    
  Pt out on unit, but not social with peers, flat blunt affect. Pt suspicious, paranoid, distractible, impaired concentration, guarded, slow to engage in conversation, requires redirection, emotional support, provided. Pt voiced anxiety, medicated with Vistaril per request.Pt reports showering today.Pt reports good appetite.Pt reports good sleep, voiced medication effective.Pt rates anxiety 5/10.Pt rates depression 0/10. On a scale from 1 through 10, 10 being the highest. Pt denies SI, HI and A/V hallucinations. Will continue to monitor.    
  Pt out on unit, but not social with peers, flat blunt affect. Pt voiced, \"I can sleep at night.\" Pt impaired concentration, paranoid, suspicious, looking around environment during conversation. Pt approaches this nurse, requesting the name of the medication, consumed during AM hours, inability to verbalize understanding, focus.Pt reports showering today.Pt reports good appetite  Pt reports good sleep.Pt rates anxiety 2/10.  Pt rates depression 4/10. On a scale from 1 through 10, 10 being the highest. Pt denies SI, HI and A/V hallucinations. Will continue to monitor.    
BEHAVIORAL HEALTH FOLLOW-UP NOTE     5/27/2024     Patient was seen and examined in person, Chart reviewed   Patient's case discussed with staff/team    Chief Complaint: psychosis    Interim History:     Patient said he was \"doing good\". He said he slept, and that his appetite was \"good\". He said his mood was \"not as depressed\". He still has difficulty focusing. He denied having suicidal or homicidal ideation. He denied having auditory hallucinations; did not answer questions about visual hallucinations. He denied paranoia. Staff reported that he has been labile and suspicious.     Appetite:  [x] Normal/Unchanged  [] Increased  [] Decreased      Sleep:       [x] Normal/Unchanged  [] Fair       [] Poor              Energy:    [x] Normal/Unchanged  [] Increased  [] Decreased        SI [] Present  [x] Absent    HI  []Present  [x] Absent     Aggression:  [] yes  [x] no      PAST MEDICAL/PSYCHIATRIC HISTORY:   Past Medical History:   Diagnosis Date    Anxiety     Attention deficit disorder     Depression        FAMILY/SOCIAL HISTORY:  No family history on file.  Social History     Socioeconomic History    Marital status: Single     Spouse name: Not on file    Number of children: Not on file    Years of education: Not on file    Highest education level: Not on file   Occupational History    Not on file   Tobacco Use    Smoking status: Never    Smokeless tobacco: Never   Vaping Use    Vaping Use: Never used   Substance and Sexual Activity    Alcohol use: Not Currently     Comment: rare    Drug use: Not Currently     Types: Marijuana (Weed)     Comment: smoked weed 4 days ago one  joint    Sexual activity: Never   Other Topics Concern    Not on file   Social History Narrative    Not on file     Social Determinants of Health     Financial Resource Strain: Not on file   Food Insecurity: Not on file   Transportation Needs: Not on file   Physical Activity: Not on file   Stress: Not on file   Social Connections: Not on file 
BEHAVIORAL HEALTH FOLLOW-UP NOTE     5/28/2024     Patient was seen and examined in person, Chart reviewed   Patient's case discussed with staff/team    Chief Complaint: psychosis    Interim History:     Pt report hearing voices talking about him   Paranoid thoughts ++  Pt is guarded and suspicious  C/o stiff jaw - received cogentin yesterday  Slept better last night  Mood less anxious  Live with mom and sister  Appetite:  [x] Normal/Unchanged  [] Increased  [] Decreased      Sleep:       [] Normal/Unchanged  [x] Fair       [] Poor              Energy:    [x] Normal/Unchanged  [] Increased  [] Decreased        SI [] Present  [x] Absent    HI  []Present  [x] Absent     Aggression:  [] yes  [x] no      PAST MEDICAL/PSYCHIATRIC HISTORY:   Past Medical History:   Diagnosis Date    Anxiety     Attention deficit disorder     Depression        FAMILY/SOCIAL HISTORY:  History reviewed. No pertinent family history.  Social History     Socioeconomic History    Marital status: Single     Spouse name: Not on file    Number of children: Not on file    Years of education: Not on file    Highest education level: Not on file   Occupational History    Not on file   Tobacco Use    Smoking status: Never    Smokeless tobacco: Never   Vaping Use    Vaping Use: Never used   Substance and Sexual Activity    Alcohol use: Not Currently     Comment: rare    Drug use: Not Currently     Types: Marijuana (Weed)     Comment: smoked weed 4 days ago one  joint    Sexual activity: Never   Other Topics Concern    Not on file   Social History Narrative    Not on file     Social Determinants of Health     Financial Resource Strain: Not on file   Food Insecurity: Not on file   Transportation Needs: Not on file   Physical Activity: Not on file   Stress: Not on file   Social Connections: Not on file   Intimate Partner Violence: Not on file   Housing Stability: Not on file           ROS:  [x] All negative/unchanged except if checked. Explain 
BEHAVIORAL HEALTH FOLLOW-UP NOTE     5/30/2024     Patient was seen and examined in person, Chart reviewed   Patient's case discussed with staff/team    Chief Complaint: psychosis    Interim History:   Pt needed PRN haldol last night for AH  AH- less intense this morning  Find difficult to trust others  Pt has been noticed to be getting close to another patient  Paranoid thoughts ++  Pt is guarded and suspicious  Slept better last night  Talking with mom and grandmother- went well    Appetite:  [x] Normal/Unchanged  [] Increased  [] Decreased      Sleep:       [] Normal/Unchanged  [x] Fair       [] Poor              Energy:    [x] Normal/Unchanged  [] Increased  [] Decreased        SI [] Present  [x] Absent    HI  []Present  [x] Absent     Aggression:  [] yes  [x] no      PAST MEDICAL/PSYCHIATRIC HISTORY:   Past Medical History:   Diagnosis Date    Anxiety     Attention deficit disorder     Depression        FAMILY/SOCIAL HISTORY:  History reviewed. No pertinent family history.  Social History     Socioeconomic History    Marital status: Single     Spouse name: Not on file    Number of children: Not on file    Years of education: Not on file    Highest education level: Not on file   Occupational History    Not on file   Tobacco Use    Smoking status: Never    Smokeless tobacco: Never   Vaping Use    Vaping Use: Never used   Substance and Sexual Activity    Alcohol use: Not Currently     Comment: rare    Drug use: Not Currently     Types: Marijuana (Weed)     Comment: smoked weed 4 days ago one  joint    Sexual activity: Never   Other Topics Concern    Not on file   Social History Narrative    Not on file     Social Determinants of Health     Financial Resource Strain: Not on file   Food Insecurity: Not on file   Transportation Needs: Not on file   Physical Activity: Not on file   Stress: Not on file   Social Connections: Not on file   Intimate Partner Violence: Not on file   Housing Stability: Not on file 
BEHAVIORAL HEALTH FOLLOW-UP NOTE     5/31/2024     Patient was seen and examined in person, Chart reviewed   Patient's case discussed with staff/team    Chief Complaint: psychosis    Interim History:   No PRN yesterday  Pt still prefer to stay in Open PICC room for his safety  Has been secluding in his room, seen talking to self  AH- less intense this morning  Find difficult to trust others  Slept better last night  Talking with mom and grandmother- went well    Appetite:  [x] Normal/Unchanged  [] Increased  [] Decreased      Sleep:       [] Normal/Unchanged  [x] Fair       [] Poor              Energy:    [x] Normal/Unchanged  [] Increased  [] Decreased        SI [] Present  [x] Absent    HI  []Present  [x] Absent     Aggression:  [] yes  [x] no      PAST MEDICAL/PSYCHIATRIC HISTORY:   Past Medical History:   Diagnosis Date    Anxiety     Attention deficit disorder     Depression        FAMILY/SOCIAL HISTORY:  History reviewed. No pertinent family history.  Social History     Socioeconomic History    Marital status: Single     Spouse name: Not on file    Number of children: Not on file    Years of education: Not on file    Highest education level: Not on file   Occupational History    Not on file   Tobacco Use    Smoking status: Never    Smokeless tobacco: Never   Vaping Use    Vaping Use: Never used   Substance and Sexual Activity    Alcohol use: Not Currently     Comment: rare    Drug use: Not Currently     Types: Marijuana (Weed)     Comment: smoked weed 4 days ago one  joint    Sexual activity: Never   Other Topics Concern    Not on file   Social History Narrative    Not on file     Social Determinants of Health     Financial Resource Strain: Not on file   Food Insecurity: Not on file   Transportation Needs: Not on file   Physical Activity: Not on file   Stress: Not on file   Social Connections: Not on file   Intimate Partner Violence: Not on file   Housing Stability: Not on file           ROS:  [x] All 
CLINICAL PHARMACY NOTE: MEDS TO BEDS    Total # of Prescriptions Filled: 3   The following medications were delivered to the patient:  Hydroxyzine Selina 25 mg Cap  Paliperidone Er 9 mg Tab  Benztropine 1 mg Tab    Additional Documentation:    
Patient attended and participated in wrap-up group   
Patient complain of EPS, voiced, \"my jaw hurts, I can't open my mouth. \" Patient medicated with Cogentin, effective. Pt able to voice, ability to open mouth without discomfort. Will continue to monitor.  
Patient continues to sleep in 376  Out in dining area, social with peers at times.   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    
Patient out on unit, social with peers,   Sleeping in 376  Sleep and appetite good  Patient appears watchful of surroundings  Patient says he is paranoid also.  Denies all   Anxiety and depression #10  Stays with mom and sisters   Goal -  stay calm   Main support -  mom    
Patient requested and received vistaril 50mg oral for anxiety rated 8.    
Patient requesting something for #10 anxiety vistaril given at 150  
Patient up  pressing on exit door of unit while peering through door window stating I have to get out of here someone is after me. Patient reluctant to comply with prescribed haldol 5 mg po prn for agitation and active psychosis, but was finally convinced with verbal reassurance and did  comply with med at 2124.  Patient subsequently hypervigilant at nurses station and without provocation scaled the nurses station half wall attempting  to enter nurses station kitchen. Patient receptive to staff  request to exit nurses station. [employer] activated and [email or web address] subsequently present to redirect patient to his room number 388 where this writer administered Vistaril 50 mg IM right deltoid for anxiety. Patient tolerated injection well and was receptive to staff request to occupy room to facilitate optimum medication efficacy. Patient relocated to room 376 for better nursing observation vantage point.  Patient verbally reassured staff is available for support.      
Patient voiced anxiety, \"I know I am not myself, somebody put something in my weed.\" Patient medicated with Vistaril. Will continue to monitor.  
Patient voiced anxiety, rate 5/10 with 10 being the greatest, medicated with Vistaril per request. Will continue to monitor.  
Pt arrived on the floor at 2:25 from the [facility] via wheelchair. Pt ambulated to his room with a strong, steady gait. Skin check performed by justen RN and Chaka HARMON.   
Pt at desk asking for \"something for pain\". Complaining of 4/10 tooth pain to right right. Offered and agreeable to PRN tylenol at this time.   
Pt did  not attend group  
Pt did not attend group  
Pt did not have a goal  
Pt did not have a goal  
Pt left unit with staff, escorted to lobby and collected by family for ride home. Belongings given to pt.  Pt denies any current suicidal ideation, homicidal ideation or hallucinations.     Mood and affect stable.  
Pt observed staring at the wall in his room and appearing as if he's having a conversation. This nurse checked and pt is alone in his room.   
Pt out on unit, social with peers, flat, blunt affect. Pt evasive, guarded, decreased disorganized thoughts. Pt voiced anxiety, medicated with Vistaril per request.Pt reports showering today.Pt reports good appetite.Pt reports good sleep, PRN medication effective.Pt rates anxiety 5/10.  Pt rates depression /10. On a scale from 1 through 10, 10 being the highest. Pt reports attending groups, observed attending musical group.Pt denies SI, HI and A/V hallucinations.Will continue to monitor.    
Pt out on unit, social with select peers, flat blunt affect. Pt labile, suspicious, inability to focus. Pt requires reinforced orientation to surroundings, able to verbalize understanding.Pt reports showering yesterday.Pt reports good appetite  Pt reports good sleep.Pt rates anxiety 3/10.  Pt rates depression 0/10. On a scale from 1 through 10, 10 being the highest. Pt reports attending groups.Pt denies SI, HI and A/V hallucinations. Will continue to monitor.    
Pt requesting PRN vistaril for 9/10 anxiety, Administered at this time without difficulty.   
Pt walking around in assigned room. Unable to sleep. Offered and accepted PRN trazodone @ 4652.  
Received prn vistaril for anxiety rated 9/10, per request.   
Received prn vistaril for increased anxiety, per request.   
Received vistaril for increased anxiety rated 10/10, per request.   
Tylenol given at 0821 for #10 left sided jaw/teeth pain  
Vistaril given at 1150 for #6 anxiety  
Vistaril given at 1258 for #8 anxiety  
Oral, Nightly PRN, Sriram Rasmussen MD, 50 mg at 05/28/24 2223    LORazepam (ATIVAN) tablet 0.5 mg, 0.5 mg, Oral, Q1H PRN **OR** LORazepam (ATIVAN) injection 0.5 mg, 0.5 mg, IntraMUSCular, Q1H PRN **OR** LORazepam (ATIVAN) tablet 1 mg, 1 mg, Oral, Q1H PRN **OR** LORazepam (ATIVAN) injection 1 mg, 1 mg, IntraMUSCular, Q1H PRN **OR** LORazepam (ATIVAN) tablet 2 mg, 2 mg, Oral, Q1H PRN **OR** LORazepam (ATIVAN) injection 2 mg, 2 mg, IntraMUSCular, Q1H PRN **OR** LORazepam (ATIVAN) tablet 3 mg, 3 mg, Oral, Q1H PRN **OR** LORazepam (ATIVAN) injection 3 mg, 3 mg, IntraMUSCular, Q1H PRN **OR** LORazepam (ATIVAN) tablet 4 mg, 4 mg, Oral, Q1H PRN **OR** LORazepam (ATIVAN) injection 4 mg, 4 mg, IntraMUSCular, Q1H PRN, Sriram Rasmussen MD      Examination:  /73   Pulse 72   Temp 97.5 °F (36.4 °C)   Resp 16   Ht 1.626 m (5' 4\")   Wt 59 kg (130 lb)   SpO2 100%   BMI 22.31 kg/m²   Gait - steady  Medication side effects(SE): none reported    Mental Status Examination:    Level of consciousness:  within normal limits   Appearance:  fair grooming and fair hygiene  Behavior/Motor:  psychomotor retardation  Attitude toward examiner:  poor eye contact, evasive, and guarded  Speech:  slow, increased latency, and monotone   Mood: constricted and decreased range  Affect:  flat  Thought processes:  slow and poverty of thought   Thought content:  Homocidal ideation denies  Suicidal Ideation:  denies suicidal ideation  Delusions: paranoid  Perceptual Disturbance: AH++  Cognition:  oriented to person, place, and time   Concentration poor  Insight poor   Judgement poor     ASSESSMENT:   Patient symptoms are:  [] Well controlled  [] Improving  [] Worsening  [x] No change      Diagnosis:   Principal Problem:    Schizophrenia (HCC)  Resolved Problems:    * No resolved hospital problems. *      LABS:    No results for input(s): \"WBC\", \"HGB\", \"PLT\" in the last 72 hours.    No results for input(s): \"NA\", \"K\", \"CL\", \"CO2\", \"BUN\", 
benefits, side effects, drug-to-drug interactions and alternatives to treatment were discussed.  Collateral information: pending  CD evaluation   Encourage patient to attend group and other milieu activities.  Discharge planning discussed with the patient and treatment team.    PSYCHOTHERAPY/COUNSELING:  [x] Therapeutic interview  [x] Supportive  [] CBT  [] Ongoing  [] Other     Patient was seen 1:1 for 20 minutes, other than E&M time spent, focusing on      - coping skills techniques     - Anxiety management techniques discussed including deep breathing exercise and PMR     - discussing patients strength and weakness        [x] Patient continues to need, on a daily basis, active treatment furnished directly by or requiring the supervision of inpatient psychiatric personnel      Anticipated Length of stay:             Electronically signed by ANDREW TREVINO MD on 6/2/2024 at 9:32 AM  
to treatment were discussed.  Collateral information: pending  CD evaluation   Encourage patient to attend group and other milieu activities.  Discharge planning discussed with the patient and treatment team.    PSYCHOTHERAPY/COUNSELING:  [x] Therapeutic interview  [x] Supportive  [] CBT  [] Ongoing  [] Other           [x] Patient continues to need, on a daily basis, active treatment furnished directly by or requiring the supervision of inpatient psychiatric personnel      Anticipated Length of stay:             Electronically signed by ANDREW TREVINO MD on 6/1/2024 at 8:40 AM

## 2024-06-03 NOTE — GROUP NOTE
Date: 6/3/2024    Group Start Time: 0935  Group End Time: 1025  Group Topic: Music Therapy    ML 3W Karine Espinoza    Music and Coping Skills: Intentional Music Listening  Patients will identify different songs that fall under various prompts (song you can't help but sing/dance to, song that inspires/gives you hope, song that encourages/motivates, song that makes you smile/laugh, song to help you cry/scream it out, and your favorite song). Patients will be invited to select a song that aligns with something they need today. Patients will be encouraged to explain their connection to the song and the experience of listening to it in this setting.    Focus: Emotion Regulation, Validation/Support, Self-Esteem, & Self-Expression    Goals: Improve Mood, Improve Insight/Self-Awareness, Increase Socialization/Community Building, Increase Self-Expression, Improve Attention to Task, Improve Coping Skills/Develop Coping Skills, Improve Emotional Regulation Skills    Patient identified songs falling under various prompts and discussed similarities in music tastes with peers. Patient moved to familiar music. Patient verbalized support for peers' song choices. Patient chose to hear a song that encourages/motivates you, but left group before he was able to hear it.     Attended: 1/2-3/4 attendance  Participation Level: Active Listener  Participation Quality: Attentive  Affect/Mood: Constricted/Euthymic  Speech: normal rate and normal volume   Thought Content/Processes: Vague  Level of consciousness: Alert  Response: Able to verbalize current knowledge/experience  Outcomes: Actively participated in the group experience and Anticipated discharge today    Discipline Responsible: Music Therapist  Signature: ENRIQUE Chavarria, PsychEd Spec

## 2024-06-06 ENCOUNTER — PATIENT OUTREACH (OUTPATIENT)
Dept: CARE COORDINATION | Facility: CLINIC | Age: 24
End: 2024-06-06

## 2024-06-06 NOTE — PROGRESS NOTES
Pt. Identified for post discharge services. Final outreach call to introduce self, available services, and assess needs.  Voicemail message left with my contact information on guardian phone. Male answered number for patient and stated it was the wrong number.   ED Hines  [facility]    Contact: [phone]

## (undated) DEVICE — PENCIL SMOKE EVAC PUSH BUTTON COATED

## (undated) DEVICE — ELECTRODE PT RET AD L9FT HI MOIST COND ADH HYDRGEL CORDED

## (undated) DEVICE — GOWN,AURORA,NONREINFORCED,LARGE: Brand: MEDLINE

## (undated) DEVICE — LABEL MED MINI W/ MARKER

## (undated) DEVICE — SYRINGE MED 10ML LUERLOCK TIP W/O SFTY DISP

## (undated) DEVICE — COUNTER NDL 40 COUNT HLD 70 FOAM BLK ADH W/ MAG

## (undated) DEVICE — SPONGE,LAP,4"X18",XR,ST,5/PK,40PK/CS: Brand: MEDLINE INDUSTRIES, INC.

## (undated) DEVICE — MARKER SURG SKIN GENTIAN VLT REG TIP W/ 6IN RUL

## (undated) DEVICE — TOWEL,OR,DSP,ST,BLUE,STD,4/PK,20PK/CS: Brand: MEDLINE

## (undated) DEVICE — GLOVE SURG SZ 75 STD WHT LTX SYN POLYMER BEAD REINF ANTI RL

## (undated) DEVICE — DBD-PACK,EENT,SIRUS,PK II: Brand: MEDLINE

## (undated) DEVICE — INTENDED FOR TISSUE SEPARATION, AND OTHER PROCEDURES THAT REQUIRE A SHARP SURGICAL BLADE TO PUNCTURE OR CUT.: Brand: BARD-PARKER ® CARBON RIB-BACK BLADES

## (undated) DEVICE — NEEDLE HYPO 25GA L1.5IN BLU POLYPR HUB S STL REG BVL STR

## (undated) DEVICE — GAUZE,SPONGE,FLUFF,6"X6.75",STRL,10/TRAY: Brand: MEDLINE